# Patient Record
Sex: MALE | Race: WHITE | NOT HISPANIC OR LATINO | Employment: STUDENT | URBAN - METROPOLITAN AREA
[De-identification: names, ages, dates, MRNs, and addresses within clinical notes are randomized per-mention and may not be internally consistent; named-entity substitution may affect disease eponyms.]

---

## 2018-07-02 ENCOUNTER — OFFICE VISIT (OUTPATIENT)
Dept: PODIATRY | Facility: CLINIC | Age: 23
End: 2018-07-02
Payer: COMMERCIAL

## 2018-07-02 VITALS
SYSTOLIC BLOOD PRESSURE: 119 MMHG | DIASTOLIC BLOOD PRESSURE: 80 MMHG | BODY MASS INDEX: 28 KG/M2 | WEIGHT: 200 LBS | HEIGHT: 71 IN | HEART RATE: 86 BPM | RESPIRATION RATE: 17 BRPM

## 2018-07-02 DIAGNOSIS — M77.41 METATARSALGIA OF BOTH FEET: ICD-10-CM

## 2018-07-02 DIAGNOSIS — M21.969 ACQUIRED DEFORMITY OF FOOT, UNSPECIFIED LATERALITY: Primary | ICD-10-CM

## 2018-07-02 DIAGNOSIS — Q66.51 CONGENITAL PES PLANUS OF RIGHT FOOT: ICD-10-CM

## 2018-07-02 DIAGNOSIS — M77.42 METATARSALGIA OF BOTH FEET: ICD-10-CM

## 2018-07-02 DIAGNOSIS — Q66.52 CONGENITAL PES PLANUS OF LEFT FOOT: ICD-10-CM

## 2018-07-02 DIAGNOSIS — M79.671 PAIN IN BOTH FEET: ICD-10-CM

## 2018-07-02 DIAGNOSIS — M79.672 PAIN IN BOTH FEET: ICD-10-CM

## 2018-07-02 PROCEDURE — L3000 FT INSERT UCB BERKELEY SHELL: HCPCS | Performed by: PODIATRIST

## 2018-07-02 PROCEDURE — 99202 OFFICE O/P NEW SF 15 MIN: CPT | Performed by: PODIATRIST

## 2018-07-02 RX ORDER — MELATONIN
1000 DAILY
COMMUNITY

## 2018-07-02 RX ORDER — ESCITALOPRAM OXALATE 20 MG/1
TABLET ORAL
Refills: 0 | COMMUNITY
Start: 2018-06-26

## 2018-07-02 RX ORDER — ALPRAZOLAM 0.5 MG/1
TABLET ORAL
Refills: 0 | COMMUNITY
Start: 2018-06-26

## 2018-07-02 RX ORDER — AMPHETAMINE SULFATE 10 MG/1
TABLET ORAL
Refills: 0 | COMMUNITY
Start: 2018-05-12

## 2018-07-02 RX ORDER — MULTIVIT-MIN/IRON FUM/FOLIC AC 7.5 MG-4
1 TABLET ORAL DAILY
COMMUNITY

## 2018-07-02 NOTE — PROGRESS NOTES
Assessment/Plan:  Metatarsalgia  Pain upon ambulation  Pes planus  Plan  Foot exam performed  Patient educated on condition  Patient's feet have been casted for custom molded foot orthotics  There are no diagnoses linked to this encounter  Subjective:  Patient has pain in his big toes with ambulation  No history of trauma   Patient ID: Estephania Villeda is a 26 y o  male  No past medical history on file  No past surgical history on file  No Known Allergies      Current Outpatient Prescriptions:     ALPRAZolam (XANAX) 0 5 mg tablet, , Disp: , Rfl: 0    cholecalciferol (VITAMIN D3) 1,000 units tablet, Take 1,000 Units by mouth daily, Disp: , Rfl:     cyanocobalamin 100 MCG tablet, Take 100 mcg by mouth daily, Disp: , Rfl:     escitalopram (LEXAPRO) 20 mg tablet, , Disp: , Rfl: 0    EVEKEO 10 MG TABS, , Disp: , Rfl: 0    Lactobacillus (ACIDOPHILUS) 0 5 MG TABS, Take by mouth, Disp: , Rfl:     Multiple Vitamins-Minerals (MULTIVITAMIN WITH MINERALS) tablet, Take 1 tablet by mouth daily, Disp: , Rfl:     There is no problem list on file for this patient  HPI    The following portions of the patient's history were reviewed and updated as appropriate: allergies, current medications, past family history, past medical history, past social history, past surgical history and problem list     Review of Systems      Historical Information   No past medical history on file  No past surgical history on file  Social History   History   Alcohol use Not on file     History   Drug use: Unknown     Family History: No family history on file  Meds/Allergies   No Known Allergies    No current outpatient prescriptions on file prior to visit  No current facility-administered medications on file prior to visit  Objective:      Foot Exam    General  General Appearance: appears stated age and healthy   Orientation: alert and oriented to person, place, and time   Affect: appropriate   Gait: unimpaired       Right Foot/Ankle     Inspection and Palpation  Ecchymosis: none  Tenderness: great toe metatarsophalangeal joint   Swelling: great toe interphalangeal joint and great toe metatarsophalangeal joint   Arch: pes planus  Hammertoes: fifth toe  Hallux valgus: yes  Hallux limitus: yes  Skin Exam: callus; Neurovascular  Dorsalis pedis: 3+  Posterior tibial: 3+  Saphenous nerve sensation: normal  Tibial nerve sensation: normal  Superficial peroneal nerve sensation: normal  Deep peroneal nerve sensation: normal  Sural nerve sensation: normal  Achilles reflex: 2+  Babinski reflex: 2+    Tests  Too many toes: positive     Comments  Hallux valgus noted bilateral  Positive pinch callus bilateral    Left Foot/Ankle      Inspection and Palpation  Ecchymosis: none  Tenderness: great toe metatarsophalangeal joint   Swelling: great toe interphalangeal joint   Arch: pes planus  Hammertoes: fifth toe  Hallux valgus: yes  Hallux limitus: yes  Skin Exam: callus; Neurovascular  Dorsalis pedis: 3+  Posterior tibial: 3+  Saphenous nerve sensation: normal  Tibial nerve sensation: normal  Superficial peroneal nerve sensation: normal  Deep peroneal nerve sensation: normal  Sural nerve sensation: normal  Achilles reflex: 2+  Babinski reflex: 2+    Tests  Too many toes: positive     Comments  Patient is pronated in stance and gait  Physical Exam   Constitutional: He appears well-developed and well-nourished  Cardiovascular: Normal rate and regular rhythm  Pulses:       Dorsalis pedis pulses are 3+ on the right side, and 3+ on the left side  Posterior tibial pulses are 3+ on the right side, and 3+ on the left side  Feet:   Right Foot:   Skin Integrity: Positive for callus  Left Foot:   Skin Integrity: Positive for callus  Neurological:   Reflex Scores:       Achilles reflexes are 2+ on the right side and 2+ on the left side

## 2018-12-28 ENCOUNTER — OFFICE VISIT (OUTPATIENT)
Dept: PODIATRY | Facility: CLINIC | Age: 23
End: 2018-12-28
Payer: COMMERCIAL

## 2018-12-28 VITALS
SYSTOLIC BLOOD PRESSURE: 110 MMHG | RESPIRATION RATE: 17 BRPM | BODY MASS INDEX: 28 KG/M2 | WEIGHT: 200 LBS | HEART RATE: 71 BPM | DIASTOLIC BLOOD PRESSURE: 75 MMHG | HEIGHT: 71 IN

## 2018-12-28 DIAGNOSIS — M77.41 METATARSALGIA OF BOTH FEET: ICD-10-CM

## 2018-12-28 DIAGNOSIS — M77.42 METATARSALGIA OF BOTH FEET: ICD-10-CM

## 2018-12-28 DIAGNOSIS — M79.672 PAIN IN BOTH FEET: ICD-10-CM

## 2018-12-28 DIAGNOSIS — M79.671 PAIN IN BOTH FEET: ICD-10-CM

## 2018-12-28 DIAGNOSIS — M21.969 ACQUIRED DEFORMITY OF FOOT, UNSPECIFIED LATERALITY: Primary | ICD-10-CM

## 2018-12-28 PROCEDURE — 99213 OFFICE O/P EST LOW 20 MIN: CPT | Performed by: PODIATRIST

## 2018-12-28 NOTE — PROGRESS NOTES
Assessment/Plan:  Metatarsalgia  Pain upon ambulation  Pes planus  Plan  Patient will use orthotics as directed  He will change shoe gear size as directed  No problem-specific Assessment & Plan notes found for this encounter  There are no diagnoses linked to this encounter  Subjective:  Patient is concerned with blister formation of the left foot  He had been on a recent hike without his orthotics  He developed a painful blister  No treatment rendered    No past medical history on file  No past surgical history on file  No Known Allergies      Current Outpatient Prescriptions:     ALPRAZolam (XANAX) 0 5 mg tablet, , Disp: , Rfl: 0    cholecalciferol (VITAMIN D3) 1,000 units tablet, Take 1,000 Units by mouth daily, Disp: , Rfl:     cyanocobalamin 100 MCG tablet, Take 100 mcg by mouth daily, Disp: , Rfl:     escitalopram (LEXAPRO) 20 mg tablet, , Disp: , Rfl: 0    EVEKEO 10 MG TABS, , Disp: , Rfl: 0    Lactobacillus (ACIDOPHILUS) 0 5 MG TABS, Take by mouth, Disp: , Rfl:     Multiple Vitamins-Minerals (MULTIVITAMIN WITH MINERALS) tablet, Take 1 tablet by mouth daily, Disp: , Rfl:     Patient Active Problem List   Diagnosis    Acquired deformity of foot    Metatarsalgia of both feet    Pain in both feet    Congenital pes planus of right foot    Congenital pes planus of left foot          Patient ID: Chula Junior is a 26 y o  male  HPI    The following portions of the patient's history were reviewed and updated as appropriate: allergies, current medications, past family history, past medical history, past social history, past surgical history and problem list     Review of Systems      Objective:  Patient's shoes and socks removed  Foot ExamPhysical Exam   Constitutional: He appears well-developed and well-nourished  Cardiovascular: Normal rate and regular rhythm  Skin:   Dried bullae submetatarsal 2 noted of the left foot  No evidence of infection    There is pinch callus of 5th toe bilateral    Nursing note and vitals reviewed  Foot Exam     General  General Appearance: appears stated age and healthy   Orientation: alert and oriented to person, place, and time   Affect: appropriate   Gait: unimpaired         Right Foot/Ankle      Inspection and Palpation  Ecchymosis: none  Tenderness: great toe metatarsophalangeal joint   Swelling: great toe interphalangeal joint and great toe metatarsophalangeal joint   Arch: pes planus  Hammertoes: fifth toe  Hallux valgus: yes  Hallux limitus: yes  Skin Exam: callus;      Neurovascular  Dorsalis pedis: 3+  Posterior tibial: 3+  Saphenous nerve sensation: normal  Tibial nerve sensation: normal  Superficial peroneal nerve sensation: normal  Deep peroneal nerve sensation: normal  Sural nerve sensation: normal  Achilles reflex: 2+  Babinski reflex: 2+     Tests  Too many toes: positive      Comments  Hallux valgus noted bilateral  Positive pinch callus bilateral     Left Foot/Ankle       Inspection and Palpation  Ecchymosis: none  Tenderness: great toe metatarsophalangeal joint   Swelling: great toe interphalangeal joint   Arch: pes planus  Hammertoes: fifth toe  Hallux valgus: yes  Hallux limitus: yes  Skin Exam: callus;      Neurovascular  Dorsalis pedis: 3+  Posterior tibial: 3+  Saphenous nerve sensation: normal  Tibial nerve sensation: normal  Superficial peroneal nerve sensation: normal  Deep peroneal nerve sensation: normal  Sural nerve sensation: normal  Achilles reflex: 2+  Babinski reflex: 2+     Tests  Too many toes: positive      Comments  Patient is pronated in stance and gait  Physical Exam   Constitutional: He appears well-developed and well-nourished  Cardiovascular: Normal rate and regular rhythm  Pulses:       Dorsalis pedis pulses are 3+ on the right side, and 3+ on the left side  Posterior tibial pulses are 3+ on the right side, and 3+ on the left side     Feet:   Right Foot:   Skin Integrity: Positive for callus  Left Foot:   Skin Integrity: Positive for callus  Neurological:   Reflex Scores:       Achilles reflexes are 2+ on the right side and 2+ on the left side

## 2020-02-09 ENCOUNTER — OFFICE VISIT (OUTPATIENT)
Dept: URGENT CARE | Facility: CLINIC | Age: 25
End: 2020-02-09
Payer: COMMERCIAL

## 2020-02-09 VITALS
BODY MASS INDEX: 27.44 KG/M2 | DIASTOLIC BLOOD PRESSURE: 90 MMHG | HEIGHT: 71 IN | TEMPERATURE: 97.9 F | HEART RATE: 67 BPM | SYSTOLIC BLOOD PRESSURE: 130 MMHG | OXYGEN SATURATION: 99 % | RESPIRATION RATE: 16 BRPM | WEIGHT: 196 LBS

## 2020-02-09 DIAGNOSIS — R06.2 WHEEZE: Primary | ICD-10-CM

## 2020-02-09 DIAGNOSIS — J98.8 RESPIRATORY TRACT INFECTION: ICD-10-CM

## 2020-02-09 PROCEDURE — 99213 OFFICE O/P EST LOW 20 MIN: CPT | Performed by: NURSE PRACTITIONER

## 2020-02-09 PROCEDURE — 94640 AIRWAY INHALATION TREATMENT: CPT | Performed by: NURSE PRACTITIONER

## 2020-02-09 RX ORDER — IPRATROPIUM BROMIDE AND ALBUTEROL SULFATE 2.5; .5 MG/3ML; MG/3ML
3 SOLUTION RESPIRATORY (INHALATION) ONCE
Status: COMPLETED | OUTPATIENT
Start: 2020-02-09 | End: 2020-02-09

## 2020-02-09 RX ORDER — FLUTICASONE PROPIONATE 50 MCG
1 SPRAY, SUSPENSION (ML) NASAL DAILY
COMMUNITY

## 2020-02-09 RX ADMIN — IPRATROPIUM BROMIDE AND ALBUTEROL SULFATE 3 ML: 2.5; .5 SOLUTION RESPIRATORY (INHALATION) at 17:00

## 2020-02-09 NOTE — LETTER
February 9, 2020     Patient: Delmis Brown   YOB: 1995   Date of Visit: 2/9/2020       To Whom it May Concern:    Anaid Chaparrobirgit was seen in my clinic on 2/9/2020  He may return to school on 2/11/2020  If you have any questions or concerns, please don't hesitate to call           Sincerely,          COHOA Cr        CC: No Recipients

## 2020-02-09 NOTE — PROGRESS NOTES
[facility]        NAME: Rosalinda Martinez is a 27 y o  male  : 1995    MRN: [id number]  DATE: 2020  TIME: 4:54 pm    Assessment and Plan   Wheeze [R06 2]  1  Wheeze  ipratropium-albuterol (DUO-NEB) 0 5-2 5 mg/3 mL inhalation solution 3 mL   2  Respiratory tract infection       Paper scripts provided for Zithromax and Prednisone due to pharmacy hours  Mom needs to find open pharmacy  Mini neb  Performed by: OCHOA Harvey  Authorized by: OCHOA Harvey     Treatment 1:   Pre-Procedure     Symptoms:  Wheezing    Lung Sounds:  Wheezing, rhonchi in bases    HR:  67    RR:  16    SP02:  99    Medication Administered:  Duoneb - Albuterol 2 5 mg/Atrovent 0 5 mg  Post-Procedure     Symptoms:  Wheezing    Lung sounds:  Less wheezing, rhinchi diminished    HR:  72    RR:  18    SP02:  99        Patient Instructions     Take antibiotic as prescribed  Take Steroid as prescribed  Tylenol as needed stay well hydrated  Rest  Follow up with PCP in 3-5 days  Proceed to  ER if symptoms worsen  Chief Complaint     Chief Complaint   Patient presents with    Cold Like Symptoms     patient complains of nasal congestion and chest congestion, also complains of vomitting, cough with some chest discomfort from the cough  Was seen by his PCP on Thursday was given no treatment  History of Present Illness       28 y/o male presents with cough, congestion, wheezing, PND, vomiting with coughing, and SOB on exertion that began 2 weeks ago  He was seen by his PCP several days ago and was instructed to try OTC remedies  Since then he states his symptoms have gotten worse  He denies fevers, N/V, or HA  He is a non-smoker  Has a history of asthma  Review of Systems   Review of Systems   Constitutional: Positive for fatigue  Negative for chills and fever  HENT: Positive for congestion, postnasal drip and rhinorrhea  Negative for ear pain and sore throat  Respiratory: Positive for cough, shortness of breath and wheezing  Cardiovascular: Negative for chest pain and palpitations  Gastrointestinal: Negative for abdominal pain, nausea and vomiting  Musculoskeletal: Negative for myalgias  Skin: Positive for pallor  Neurological: Negative for headaches  Current Medications       Current Outpatient Medications:     ALPRAZolam (XANAX) 0 5 mg tablet, , Disp: , Rfl: 0    cholecalciferol (VITAMIN D3) 1,000 units tablet, Take 1,000 Units by mouth daily, Disp: , Rfl:     cyanocobalamin 100 MCG tablet, Take 100 mcg by mouth daily, Disp: , Rfl:     escitalopram (LEXAPRO) 20 mg tablet, , Disp: , Rfl: 0    EVEKEO 10 MG TABS, , Disp: , Rfl: 0    fluticasone (FLONASE) 50 mcg/act nasal spray, 1 spray into each nostril daily, Disp: , Rfl:     Lactobacillus (ACIDOPHILUS) 0 5 MG TABS, Take by mouth, Disp: , Rfl:     Multiple Vitamins-Minerals (MULTIVITAMIN WITH MINERALS) tablet, Take 1 tablet by mouth daily, Disp: , Rfl:     Current Allergies     Allergies as of 02/09/2020    (No Known Allergies)            The following portions of the patient's history were reviewed and updated as appropriate: allergies, current medications, past family history, past medical history, past social history, past surgical history and problem list      Past Medical History:   Diagnosis Date    Asthma     Autism     Fracture     Left arm    Reactive airway disease        Past Surgical History:   Procedure Laterality Date    NO PAST SURGERIES         History reviewed  No pertinent family history  Medications have been verified  Objective   /90 (BP Location: Left arm, Patient Position: Sitting, Cuff Size: Standard)   Pulse 67   Temp 97 9 °F (36 6 °C) (Tympanic)   Resp 16   Ht 5' 11" (1 803 m)   Wt 88 9 kg (196 lb)   SpO2 99%   BMI 27 34 kg/m²        Physical Exam     Physical Exam   Constitutional: He is oriented to person, place, and time   Vital signs are normal  He appears well-developed and well-nourished  He appears ill  HENT:   Right Ear: Tympanic membrane, external ear and ear canal normal    Left Ear: Tympanic membrane, external ear and ear canal normal    Nose: Mucosal edema and rhinorrhea present  Mouth/Throat: Posterior oropharyngeal erythema present  No posterior oropharyngeal edema  Posterior pharynx is red and inflamed, cobblestone in appearance  Cardiovascular: Normal rate, regular rhythm and normal heart sounds  Pulmonary/Chest: Effort normal  No respiratory distress  He has wheezes in the right upper field and the left upper field  He has rhonchi in the right lower field and the left lower field  End expiratory wheeze present in bilateral upper lobes  Rhonchi present in bilateral bases, rhonchi cleared after breathing treatment  Musculoskeletal: Normal range of motion  Lymphadenopathy:        Head (right side): No tonsillar adenopathy present  Head (left side): No tonsillar adenopathy present  He has no cervical adenopathy  Neurological: He is alert and oriented to person, place, and time  He has normal strength  GCS eye subscore is 4  GCS verbal subscore is 5  GCS motor subscore is 6  Skin: Skin is warm and dry  No rash noted  Psychiatric: He has a normal mood and affect  His speech is normal and behavior is normal    Nursing note and vitals reviewed

## 2020-02-09 NOTE — PATIENT INSTRUCTIONS
Take antibiotic as prescribed  Take Steroid as prescribed  Tylenol as needed stay well hydrated  Rest

## 2020-07-17 ENCOUNTER — TELEMEDICINE (OUTPATIENT)
Dept: BEHAVIORAL/MENTAL HEALTH CLINIC | Facility: CLINIC | Age: 25
End: 2020-07-17
Payer: COMMERCIAL

## 2020-07-17 DIAGNOSIS — F84.0 AUTISTIC DISORDER, RESIDUAL STATE: Primary | ICD-10-CM

## 2020-07-17 PROCEDURE — 90834 PSYTX W PT 45 MINUTES: CPT

## 2020-07-17 NOTE — BH TREATMENT PLAN
Anatoliy Hargrove  1995       Date of Initial Treatment Plan: 7/17/20   Date of Current Treatment Plan: 07/17/20    Treatment Plan Number 1     Strengths/Personal Resources for Self Care: supportive family and creative    Diagnosis:   1  Autistic disorder, residual state         Area of Needs: social skills      Long Term Goal 1: Richard improve social comfort levels    Target Date: N/A  Completion Date: N/A         Short Term Objectives for Goal 1: Averbally report positive outcomes of socializing and Bdescribe positive feelings associated with socializing  Long Term Goal 2: N/A    Target Date: N/A  Completion Date: N/A    Short Term Objectives for Goal 2: N/A         Long Term Goal # 3: N/A     Target Date: N/A  Completion Date: N/A    Short Term Objectives for Goal 3: N/A    GOAL 1: Modality: Individual 2x per month   Completion Date 12/17/20    GOAL 2: Modality: Individual 2x per month   Completion Date 12/17/20     GOAL 3: Modality: The person(s) responsible for carrying out the plan is  Yoko Billy and [address] [place] [address]: Diagnosis and Treatment Plan explained to Zulema Munguia relates understanding diagnosis and is agreeable to Treatment Plan         Client Comments : Please share your thoughts, feelings, need and/or experiences regarding your treatment plan: ct agreed to treatment plan during virtual session

## 2020-07-17 NOTE — PSYCH
Psychotherapy Provided: Individual Psychotherapy 45 minutes     Length of time in session: 45 minutes, follow up in 2 week    Goals addressed in session: Goal 1     Pain:      none    0    Current suicide risk : Low     D- ct reports that his college classes will be online and since he does not do well in online courses he will only take one course  Ct has been helping with an art class and he finds it to be rewarding as he communicates better with younger people  Ct reports that he got together with peers and did a ghost Buster appearance at the drive in movies  Ct enjoyed the experience and enjoys dressing up  Ct is making a Star Wars costume for his cousin to be given to her next weekend  Ct denies any family issues  A- ct was tense in session and speech was halting at times  Ct has a difficult time having a conversation that is 2 sided  P- ct will attend sessions as scheduled and work on communication skills  Behavioral Health Treatment Plan [facility]: Diagnosis and Treatment Plan explained to Esperanza Crandall relates understanding diagnosis and is agreeable to Treatment Plan   Yes

## 2020-08-14 ENCOUNTER — SOCIAL WORK (OUTPATIENT)
Dept: BEHAVIORAL/MENTAL HEALTH CLINIC | Facility: CLINIC | Age: 25
End: 2020-08-14
Payer: COMMERCIAL

## 2020-08-14 DIAGNOSIS — F84.0 AUTISTIC DISORDER, RESIDUAL STATE: Primary | ICD-10-CM

## 2020-08-14 PROCEDURE — 90834 PSYTX W PT 45 MINUTES: CPT

## 2020-08-14 NOTE — PSYCH
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  Ct denies being bored this summer like he was in previous summers  Ct believes that a coworker is writing negative reviews about him on the web  Ct cannot prove it but some of the stuff written is something she or the owner would know  A- ct was a little anxious and tense  Ct exhibits poor eye contact and when thinking of what to say moves his head frequently thus avoiding eye contact  P- ct will attend sessions and use techniques to improve social skills  Behavioral Health Treatment Plan [facility]: Diagnosis and Treatment Plan explained to Joel Krishnan relates understanding diagnosis and is agreeable to Treatment Plan   Yes

## 2020-09-11 ENCOUNTER — SOCIAL WORK (OUTPATIENT)
Dept: BEHAVIORAL/MENTAL HEALTH CLINIC | Facility: CLINIC | Age: 25
End: 2020-09-11
Payer: COMMERCIAL

## 2020-09-11 DIAGNOSIS — F84.0 AUTISTIC DISORDER, RESIDUAL STATE: Primary | ICD-10-CM

## 2020-09-11 PROCEDURE — 90834 PSYTX W PT 45 MINUTES: CPT

## 2020-09-11 NOTE — PSYCH
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  Ct is pleased with that since he was thinking of leaving  Ct reports that his mother was in the hospital for 2 days due to numbness related to autoimmune issues  Ct reports that she is home  Ct is missing in person college and other activities  Ct is keeping busy online with friends  Ct just recently joined a [employer] group that [employer] in the [facility]  A ct presented as stressed and anxious  Ct thought content was clear and focused  Ct eye contact was fair  Ct insight and judgement are limited  Behavioral Health Treatment Plan [facility]: Diagnosis and Treatment Plan explained to Wilber Menjivar relates understanding diagnosis and is agreeable to Treatment Plan   Yes

## 2020-10-09 ENCOUNTER — SOCIAL WORK (OUTPATIENT)
Dept: BEHAVIORAL/MENTAL HEALTH CLINIC | Facility: CLINIC | Age: 25
End: 2020-10-09
Payer: COMMERCIAL

## 2020-10-09 DIAGNOSIS — F84.0 AUTISTIC DISORDER, RESIDUAL STATE: Primary | ICD-10-CM

## 2020-10-09 PROCEDURE — 90834 PSYTX W PT 45 MINUTES: CPT

## 2020-10-23 ENCOUNTER — SOCIAL WORK (OUTPATIENT)
Dept: BEHAVIORAL/MENTAL HEALTH CLINIC | Facility: CLINIC | Age: 25
End: 2020-10-23
Payer: COMMERCIAL

## 2020-10-23 DIAGNOSIS — F84.0 AUTISTIC DISORDER, RESIDUAL STATE: Primary | ICD-10-CM

## 2020-10-23 PROCEDURE — 90834 PSYTX W PT 45 MINUTES: CPT

## 2020-11-06 ENCOUNTER — SOCIAL WORK (OUTPATIENT)
Dept: BEHAVIORAL/MENTAL HEALTH CLINIC | Facility: CLINIC | Age: 25
End: 2020-11-06
Payer: COMMERCIAL

## 2020-11-06 DIAGNOSIS — F84.0 AUTISTIC DISORDER, RESIDUAL STATE: Primary | ICD-10-CM

## 2020-11-06 PROCEDURE — 90834 PSYTX W PT 45 MINUTES: CPT

## 2020-12-04 ENCOUNTER — TELEMEDICINE (OUTPATIENT)
Dept: BEHAVIORAL/MENTAL HEALTH CLINIC | Facility: CLINIC | Age: 25
End: 2020-12-04
Payer: COMMERCIAL

## 2020-12-04 DIAGNOSIS — F84.0 AUTISTIC DISORDER, RESIDUAL STATE: Primary | ICD-10-CM

## 2020-12-04 PROCEDURE — 90834 PSYTX W PT 45 MINUTES: CPT

## 2020-12-18 ENCOUNTER — TELEMEDICINE (OUTPATIENT)
Dept: BEHAVIORAL/MENTAL HEALTH CLINIC | Facility: CLINIC | Age: 25
End: 2020-12-18
Payer: COMMERCIAL

## 2020-12-18 DIAGNOSIS — F84.0 AUTISTIC DISORDER, RESIDUAL STATE: Primary | ICD-10-CM

## 2020-12-18 PROCEDURE — 90834 PSYTX W PT 45 MINUTES: CPT

## 2021-01-15 ENCOUNTER — TELEMEDICINE (OUTPATIENT)
Dept: BEHAVIORAL/MENTAL HEALTH CLINIC | Facility: CLINIC | Age: 26
End: 2021-01-15
Payer: COMMERCIAL

## 2021-01-15 DIAGNOSIS — F84.0 AUTISTIC DISORDER, RESIDUAL STATE: Primary | ICD-10-CM

## 2021-01-15 PROCEDURE — 90834 PSYTX W PT 45 MINUTES: CPT

## 2021-01-15 NOTE — BH TREATMENT PLAN
Gilmar Ramirez  1995       Date of Initial Treatment Plan: 7/17/2020   Date of Current Treatment Plan: 01/15/21    Treatment Plan Number 2     Strengths/Personal Resources for Self Care: improved social skills    Diagnosis:   1  Autistic disorder, residual state         Area of Needs: conversation pace and eye contact      Long Term Goal 1: Bonny find work, get drivers license, and continue networking on social media    Target Date: N/A  Completion Date: N/A         Short Term Objectives for Goal 1: Act will work with [employer] to find work, Bct will practice and work with  and Dixie Portillo 72[address] will 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 and have portfolio ready to share    Long Term Goal 2: N/A    Target Date: N/A  Completion Date: N/A    Short Term Objectives for Goal 2: N/A         Long Term Goal # 3: N/A     Target Date: N/A  Completion Date: N/A    Short Term Objectives for Goal 3: N/A    GOAL 1: Modality: Individual 2x per month   Completion Date 6/15/21        [address]: Diagnosis and Treatment Plan explained to Ananya Hardin relates understanding diagnosis and is agreeable to Treatment Plan         Client Comments : Please share your thoughts, feelings, need and/or experiences regarding your treatment plan: ct agreed

## 2021-01-15 NOTE — PSYCH
Psychotherapy Provided: Individual Psychotherapy 45 minutes     Length of time in session: 45 minutes, follow up in 2 week    Goals addressed in session: Goal 1     Pain:      none    0    Current suicide risk : Low     D- ct shared that he failed the one college class he retook  Ct was upset because the class was virtual and he does better with concrete feedback  Ct is taking time off from school and will work with [employer] to get a job  Ct reports that he had a good holiday season and participated with the family  Ct shared that he is working on side projects with friends and has a job to add special effects to someone's video  Ct session was a phone session and ct agreed to parameters of phone session  A- ct presented with anxious mood in regards to college class  Ct denied any depressed or down moods  Ct reports decent sleep  Ct insight and judgement are limited  P- ct will attend sessions and use techniques to improve social skills and reach goals  Behavioral Health Treatment Plan [facility]: Diagnosis and Treatment Plan explained to Fabian Hernandez relates understanding diagnosis and is agreeable to Treatment Plan   Yes

## 2021-01-29 ENCOUNTER — SOCIAL WORK (OUTPATIENT)
Dept: BEHAVIORAL/MENTAL HEALTH CLINIC | Facility: CLINIC | Age: 26
End: 2021-01-29
Payer: COMMERCIAL

## 2021-01-29 DIAGNOSIS — F84.0 AUTISTIC DISORDER, RESIDUAL STATE: Primary | ICD-10-CM

## 2021-01-29 PROCEDURE — 90834 PSYTX W PT 45 MINUTES: CPT

## 2021-01-29 NOTE — PSYCH
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  Ct reports less stress at home  Ct shared that he is staying up late but hat he is not over sleeping  Ct is looking to get a job and [employer] is helping him do that  A- ct was mildly anxious and eye contact was fair  Ct has a difficult time maintaining a conversation in session for more then a couple of sentences  Ct insight and judgement are limited  P- ct will attend session and use techniques to improve communication  Behavioral Health Treatment Plan [facility]: Diagnosis and Treatment Plan explained to Augustin Madrid relates understanding diagnosis and is agreeable to Treatment Plan   Yes

## 2021-02-12 ENCOUNTER — SOCIAL WORK (OUTPATIENT)
Dept: BEHAVIORAL/MENTAL HEALTH CLINIC | Facility: CLINIC | Age: 26
End: 2021-02-12
Payer: COMMERCIAL

## 2021-02-12 DIAGNOSIS — F84.0 AUTISTIC DISORDER, RESIDUAL STATE: Primary | ICD-10-CM

## 2021-02-12 PROCEDURE — 90834 PSYTX W PT 45 MINUTES: CPT

## 2021-02-12 NOTE — PSYCH
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  Ct is practicing his driving  Ct has been on several highways and is getting more comfortable  Ct is feeling less stress now that he is not in college  Ct is looking for work with [employer]  Ct went to a convention for the first time in close to a year and it went well  Ct went dressed as a [employer]  A-  Ct presented with fair eye contact  Ct was verbal and answered all questions  Ct struggles with maintaining a conversation and clinician usually has to extend it or change to a new topic  P- ct will attend sessions and use techniques to improve social skills  Behavioral Health Treatment Plan [facility]: Diagnosis and Treatment Plan explained to Maria Luisa Estrada relates understanding diagnosis and is agreeable to Treatment Plan   Yes

## 2021-02-26 ENCOUNTER — SOCIAL WORK (OUTPATIENT)
Dept: BEHAVIORAL/MENTAL HEALTH CLINIC | Facility: CLINIC | Age: 26
End: 2021-02-26
Payer: COMMERCIAL

## 2021-02-26 DIAGNOSIS — F84.0 AUTISTIC DISORDER, RESIDUAL STATE: Primary | ICD-10-CM

## 2021-02-26 PROCEDURE — 90834 PSYTX W PT 45 MINUTES: CPT

## 2021-02-26 NOTE — PSYCH
Psychotherapy Provided: Individual Psychotherapy 45 minutes     Length of time in session: 45 minutes, follow up in 2 week    Goals addressed in session: Goal 1     Pain:      none    0    Current suicide risk : Low     D- ct reported that he has been disappointed that [employer] has not been able to help him with finding work  Ct was encouraged to communicate with them and ask them if he is doing anything wrong in regards to the process  Ct shared that they send him a list of openings and he responds that he is interested then nothing happens  Ct is starting to feel the strain of having too much free time  Ct keeps busy online and with his side projects but the pandemic and the cold have curtailed his activities  Ct is going to a small con this Sunday  A- ct presented with anxious mood  Ct eye contact was fair  Ct insight and judgement were fair  P- ct will attend session and use techniques to lower anxiety and improve social situations  Behavioral Health Treatment Plan [facility]: Diagnosis and Treatment Plan explained to Augustin Madrid relates understanding diagnosis and is agreeable to Treatment Plan   Yes

## 2021-03-12 ENCOUNTER — SOCIAL WORK (OUTPATIENT)
Dept: BEHAVIORAL/MENTAL HEALTH CLINIC | Facility: CLINIC | Age: 26
End: 2021-03-12
Payer: COMMERCIAL

## 2021-03-12 DIAGNOSIS — F84.0 AUTISTIC DISORDER, RESIDUAL STATE: Primary | ICD-10-CM

## 2021-03-12 PROCEDURE — 90834 PSYTX W PT 45 MINUTES: CPT

## 2021-03-12 NOTE — PSYCH
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 now that the weather is clearing up  Ct denies any issues with anxiety and mood issues  Ct is feeling less bored because he helped his grandmother move and 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

## 2021-04-09 ENCOUNTER — SOCIAL WORK (OUTPATIENT)
Dept: BEHAVIORAL/MENTAL HEALTH CLINIC | Facility: CLINIC | Age: 26
End: 2021-04-09
Payer: COMMERCIAL

## 2021-04-09 DIAGNOSIS — F84.0 AUTISTIC DISORDER, RESIDUAL STATE: Primary | ICD-10-CM

## 2021-04-09 PROCEDURE — 90834 PSYTX W PT 45 MINUTES: CPT

## 2021-04-09 NOTE — PSYCH
Psychotherapy Provided: Individual Psychotherapy 45 minutes     Length of time in session: 45 minutes, follow up in 2 week    Goals addressed in session: Goal 1     Pain:      none    0    Current suicide risk : Low     D- ct shared that he was disappointed that he did not go to [place] to visit with family for Easter  Ct feels that his mother not telling him why they did not go  Ct shared that he is trying to keep busy with friends and some creative projects  Ct reports that [employer] has not been able to help him find a job  Ct will reach out to them and may need my assistance to reach out to them  A- ct was mildly anxious  Ct was more verbal this session  Ct still has trouble having a conversation and maintaining it  Ct insight and judgement is fair  P- ct will attend session, take med's as needed, and will use techniques to manage anxiety and improve social skills  Behavioral Health Treatment Plan [facility]: Diagnosis and Treatment Plan explained to Atul Nash relates understanding diagnosis and is agreeable to Treatment Plan   Yes

## 2021-04-23 ENCOUNTER — SOCIAL WORK (OUTPATIENT)
Dept: BEHAVIORAL/MENTAL HEALTH CLINIC | Facility: CLINIC | Age: 26
End: 2021-04-23
Payer: COMMERCIAL

## 2021-04-23 DIAGNOSIS — F84.0 AUTISTIC DISORDER, RESIDUAL STATE: Primary | ICD-10-CM

## 2021-04-23 PROCEDURE — 90834 PSYTX W PT 45 MINUTES: CPT

## 2021-04-23 NOTE — PSYCH
Psychotherapy Provided: Individual Psychotherapy 45 minutes     Length of time in session: 45 minutes, follow up in 2 week    Encounter Diagnosis     ICD-10-CM    1  Autistic disorder, residual state  F84 0        Goals addressed in session: Goal 1     Pain:      none    0    Current suicide risk : Low     D- ct shared that he is going for an on the job interview next week with [employer]  Ct is looking forward to it  We went over some social skills and ct is confident he will do well  Ct will meet with Uncle this week to work on his car  Ct denies any issues with family at this time  Ct is keeping busy with his online projects as well as creating a throne from an old chair he found in the shed  A- ct presented with euthymic mood and flat affect  Ct eye contact was fair  Ct was engaged in session but struggles with detailed talking points  P- ct will attend session, take med's as ordered, and use techniques to improve social skills  Behavioral Health Treatment Plan [facility]: Diagnosis and Treatment Plan explained to Alphonso Lawrence relates understanding diagnosis and is agreeable to Treatment Plan   Yes

## 2021-05-21 ENCOUNTER — SOCIAL WORK (OUTPATIENT)
Dept: BEHAVIORAL/MENTAL HEALTH CLINIC | Facility: CLINIC | Age: 26
End: 2021-05-21
Payer: COMMERCIAL

## 2021-05-21 DIAGNOSIS — F84.0 AUTISTIC DISORDER, RESIDUAL STATE: Primary | ICD-10-CM

## 2021-05-21 PROCEDURE — 90834 PSYTX W PT 45 MINUTES: CPT

## 2021-05-21 NOTE — PSYCH
Psychotherapy Provided: Individual Psychotherapy 45 minutes     Length of time in session: 45 minutes, follow up in 2 week    Encounter Diagnosis     ICD-10-CM    1  Autistic disorder, residual state  F84 0        Goals addressed in session: Goal 1     Pain:      none    0    Current suicide risk : Low     D- ct shared that he had a birthday yesterday and that it went well  Ct has been working on his car  Ct  Did not get job at [employer]  Ct is applying to [employer]  Ct has 2 conventions coming up in the month of June  Ct is mildly annoyed with mother who is giving him advice on how to go about hosting a table at a con  Ct is also feeling discouraged about not meeting women however ct would like for them to initiate since he has anxiety over initiating conversations with females  A- ct presented as mildly anxious with blunted affect  Ct reports good sleep and just being bored some of the time  P- ct will attend session, and use techniques to decrease anxiety and improve communication  Behavioral Health Treatment Plan [facility]: Diagnosis and Treatment Plan explained to Oj Delgado relates understanding diagnosis and is agreeable to Treatment Plan   Yes

## 2021-06-04 ENCOUNTER — SOCIAL WORK (OUTPATIENT)
Dept: BEHAVIORAL/MENTAL HEALTH CLINIC | Facility: CLINIC | Age: 26
End: 2021-06-04
Payer: COMMERCIAL

## 2021-06-04 DIAGNOSIS — F84.0 AUTISTIC DISORDER, RESIDUAL STATE: Primary | ICD-10-CM

## 2021-06-04 PROCEDURE — 90834 PSYTX W PT 45 MINUTES: CPT
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
## 2021-07-02 ENCOUNTER — SOCIAL WORK (OUTPATIENT)
Dept: BEHAVIORAL/MENTAL HEALTH CLINIC | Facility: CLINIC | Age: 26
End: 2021-07-02
Payer: COMMERCIAL

## 2021-07-02 DIAGNOSIS — F84.0 AUTISTIC DISORDER, RESIDUAL STATE: Primary | ICD-10-CM

## 2021-07-02 PROCEDURE — 90834 PSYTX W PT 45 MINUTES: CPT

## 2021-07-02 NOTE — PSYCH
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

## 2021-07-02 NOTE — BH TREATMENT PLAN
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, Bct will continue to send out resumes and Cct will network with peers who have jobs and seek out referrals    Long Term Goal 2: N/A    Target Date: N/A  Completion Date: N/A    Short Term Objectives for Goal 2: N/A         Long Term Goal # 3: N/A     Target Date: N/A  Completion Date: N/A    Short Term Objectives for Goal 3: N/A    GOAL 1: Modality: Individual 2x per month   Completion Date 12/2/2021    [address]: Diagnosis and Treatment Plan explained to Sangeetha Carbone relates understanding diagnosis and is agreeable to Treatment Plan  Client Comments : Please share your thoughts, feelings, need and/or experiences regarding your treatment plan: ct agreed

## 2021-07-10 ENCOUNTER — OFFICE VISIT (OUTPATIENT)
Dept: URGENT CARE | Facility: CLINIC | Age: 26
End: 2021-07-10
Payer: COMMERCIAL

## 2021-07-10 VITALS
RESPIRATION RATE: 18 BRPM | HEART RATE: 101 BPM | BODY MASS INDEX: 27.02 KG/M2 | TEMPERATURE: 98.1 F | DIASTOLIC BLOOD PRESSURE: 76 MMHG | OXYGEN SATURATION: 98 % | WEIGHT: 193 LBS | SYSTOLIC BLOOD PRESSURE: 115 MMHG | HEIGHT: 71 IN

## 2021-07-10 DIAGNOSIS — J32.9 RHINOSINUSITIS: Primary | ICD-10-CM

## 2021-07-10 DIAGNOSIS — J31.0 RHINOSINUSITIS: Primary | ICD-10-CM

## 2021-07-10 PROCEDURE — 99213 OFFICE O/P EST LOW 20 MIN: CPT | Performed by: PHYSICIAN ASSISTANT

## 2021-07-10 RX ORDER — FLUTICASONE PROPIONATE 50 MCG
1 SPRAY, SUSPENSION (ML) NASAL DAILY
Qty: 1 G | Refills: 0 | Status: SHIPPED | OUTPATIENT
Start: 2021-07-10

## 2021-07-10 RX ORDER — AMOXICILLIN 875 MG/1
875 TABLET, COATED ORAL 2 TIMES DAILY
Qty: 14 TABLET | Refills: 0 | Status: SHIPPED | OUTPATIENT
Start: 2021-07-10 | End: 2021-07-17

## 2021-07-10 NOTE — PATIENT INSTRUCTIONS
Rhinosinusitis   WHAT YOU NEED TO KNOW:   Rhinosinusitis (RS) is inflammation of your nose and sinuses  It commonly begins as a virus, often as a common cold  Viruses usually last 7 to 10 days and do not need treatment  When the virus does not get better on its own, you may have bacterial RS  This means that bacteria have begun to grow inside your sinuses  Acute RS lasts less than 4 weeks  Chronic RS lasts 12 weeks or more  Recurrent RS is when you have 4 or more episodes of RS in one year  DISCHARGE INSTRUCTIONS:   Return to the emergency department if:   · Your eye and eyelid are red, swollen, and painful  · You cannot open your eye  · You have double vision or you cannot see  · Your eyeball bulges out or you cannot move your eye  · You are more sleepy than normal or you notice changes in your ability to think, move, or talk  · You have a stiff neck, a fever, or a bad headache  · You have swelling of your forehead or scalp  Contact your healthcare provider if:   · Your symptoms are worse or do not improve after 3 to 5 days of treatment  · You have questions or concerns about your condition or care  Medicines: You may need any of the following:  · Acetaminophen  decreases pain and fever  It is available without a doctor's order  Ask how much to take and how often to take it  Follow directions  Acetaminophen can cause liver damage if not taken correctly  · NSAIDs , such as ibuprofen, help decrease swelling, pain, and fever  This medicine is available with or without a doctor's order  NSAIDs can cause stomach bleeding or kidney problems in certain people  If you take blood thinner medicine, always ask your healthcare provider if NSAIDs are safe for you  Always read the medicine label and follow directions  · Nasal steroid sprays  decrease inflammation in your nose and sinuses  · Decongestants  reduce swelling and drain mucus in the nose and sinuses   They may help you breathe easier  · Antihistamines  dry mucus in the nose and relieve sneezing  · Antibiotics  treat a bacterial infection and may be needed if your symptoms do not improve or they get worse  · Take your medicine as directed  Contact your healthcare provider if you think your medicine is not helping or if you have side effects  Tell him or her if you are allergic to any medicine  Keep a list of the medicines, vitamins, and herbs you take  Include the amounts, and when and why you take them  Bring the list or the pill bottles to follow-up visits  Carry your medicine list with you in case of an emergency  Self-care:   · Rinse your sinuses  Use a sinus rinse device to rinse your nasal passages with a saline (salt water) solution  This will help thin the mucus in your nose and rinse away pollen and dirt  It will also help reduce swelling so you can breathe normally  Ask your healthcare provider how often to do this  · Breathe in steam   Heat a bowl of water until you see steam  Lean over the bowl and make a tent over your head with a large towel  Breathe deeply for about 20 minutes  Be careful not to get too close to the steam or burn yourself  Do this 3 times a day  You can also breathe deeply when you take a hot shower  · Sleep with your head elevated  Place an extra pillow under your head before you go to sleep to help your sinuses drain  · Drink liquids as directed  Ask your healthcare provider how much liquid to drink each day and which liquids are best for you  Liquids will thin the mucus in your nose and help it drain  Avoid drinks that contain alcohol or caffeine  · Do not smoke, and avoid secondhand smoke  Nicotine and other chemicals in cigarettes and cigars can make your symptoms worse  Ask your healthcare provider for information if you currently smoke and need help to quit  E-cigarettes or smokeless tobacco still contain nicotine   Talk to your healthcare provider before you use these products  Follow up with your healthcare provider as directed: Follow up if your symptoms are worse or not better after 3 to 5 days of treatment  Write down your questions so you remember to ask them during your visits  © Copyright [address] Information is for End User's use only and may not be sold, redistributed or otherwise used for commercial purposes  All illustrations and images included in CareNotes® are the copyrighted property of A D A M , Inc  or [facility]   The above information is an  only  It is not intended as medical advice for individual conditions or treatments  Talk to your doctor, nurse or pharmacist before following any medical regimen to see if it is safe and effective for you  Acute Rhinosinusitis    -Due to the severity and duration of the patients symptoms will begin treatment with Amoxicillin 875mg taken as prescribed  Take with food and a probiotic    -Fluticasone nasal spray for nasal congestion and PND  -Stay well hydrated and rest  You can use a humidifier next to your bed  Take steam showers  -Take Advil or Tylenol for fever or pain  -You can take OTC Mucinex  You may try nasal rinses     -If your symptoms worsen or change follow up with your PCP immediately

## 2021-07-10 NOTE — PROGRESS NOTES
[facility]        NAME: Kameron Rodriguez is a 27 y o  male  : 1995    MRN: [id number]  DATE: July 10, 2021  TIME: 2:18PM    Assessment and Plan   Rhinosinusitis [J31 0, J32 9]  1  Rhinosinusitis  amoxicillin (AMOXIL) 875 mg tablet    fluticasone (FLONASE) 50 mcg/act nasal spray         Patient Instructions   Acute Rhinosinusitis    -Due to the severity and duration of the patients symptoms will begin treatment with Amoxicillin 875mg taken as prescribed  Take with food and a probiotic    -Fluticasone nasal spray for nasal congestion and PND  -Stay well hydrated and rest  You can use a humidifier next to your bed  Take steam showers  -Take Advil or Tylenol for fever or pain  -You can take OTC Mucinex  You may try nasal rinses  -If your symptoms worsen or change follow up with your PCP immediately       Follow up with PCP in 3-5 days  Proceed to  ER if symptoms worsen  Chief Complaint     Chief Complaint   Patient presents with    Cold Like Symptoms     NASAL CONGESTION, THROAT CONGESTION PND         History of Present Illness       The patient is a 21-year-old male who presents today for a two day hx of nasal congestion and post nasal drainage  He has a hx of seasonal allergies and has been taking Nyquil and Zyrtec daily  Over the last two days he has worsening congestion and PND  He has mild maxillary sinus pressure and pain  No fever, chills, body aches, headache  No dizziness or weakness  No cough, dyspnea, wheezing  No GI sx  No sick contacts or recent travel  He did not have the COVID vaccination yet  Review of Systems   Review of Systems   Constitutional: Positive for fatigue  Negative for activity change, appetite change, chills, diaphoresis and fever  HENT: Positive for congestion, postnasal drip, rhinorrhea, sinus pressure and sinus pain  Negative for ear discharge, ear pain, facial swelling, hearing loss, sore throat, tinnitus, trouble swallowing and voice change  Eyes: Negative for visual disturbance  Respiratory: Negative for apnea, cough, chest tightness, shortness of breath, wheezing and stridor  Cardiovascular: Negative for chest pain, palpitations and leg swelling  Gastrointestinal: Negative for abdominal distention and abdominal pain  Genitourinary: Negative for decreased urine volume  Musculoskeletal: Negative for arthralgias, joint swelling, myalgias, neck pain and neck stiffness  Skin: Negative for rash  Allergic/Immunologic: Negative for immunocompromised state  Neurological: Negative for dizziness, weakness, light-headedness, numbness and headaches  Hematological: Negative for adenopathy           Current Medications       Current Outpatient Medications:     cholecalciferol (VITAMIN D3) 1,000 units tablet, Take 1,000 Units by mouth daily, Disp: , Rfl:     cyanocobalamin 100 MCG tablet, Take 100 mcg by mouth daily, Disp: , Rfl:     escitalopram (LEXAPRO) 20 mg tablet, , Disp: , Rfl: 0    fluticasone (FLONASE) 50 mcg/act nasal spray, 1 spray into each nostril daily, Disp: , Rfl:     Lactobacillus (ACIDOPHILUS) 0 5 MG TABS, Take by mouth, Disp: , Rfl:     Multiple Vitamins-Minerals (MULTIVITAMIN WITH MINERALS) tablet, Take 1 tablet by mouth daily, Disp: , Rfl:     ALPRAZolam (XANAX) 0 5 mg tablet, , Disp: , Rfl: 0    amoxicillin (AMOXIL) 875 mg tablet, Take 1 tablet (875 mg total) by mouth 2 (two) times a day for 7 days, Disp: 14 tablet, Rfl: 0    EVEKEO 10 MG TABS, , Disp: , Rfl: 0    fluticasone (FLONASE) 50 mcg/act nasal spray, 1 spray into each nostril daily, Disp: 1 g, Rfl: 0    Current Allergies     Allergies as of 07/10/2021    (No Known Allergies)            The following portions of the patient's history were reviewed and updated as appropriate: allergies, current medications, past family history, past medical history, past social history, past surgical history and problem list      Past Medical History:   Diagnosis Date    Asthma     Autism     Fracture     Left arm    Reactive airway disease        Past Surgical History:   Procedure Laterality Date    NO PAST SURGERIES         No family history on file  Medications have been verified  Objective   /76   Pulse 101   Temp 98 1 °F (36 7 °C)   Resp 18   Ht 5' 11" (1 803 m)   Wt 87 5 kg (193 lb)   SpO2 98%   BMI 26 92 kg/m²   No LMP for male patient  Physical Exam     Physical Exam  Vitals and nursing note reviewed  Constitutional:       General: He is not in acute distress  Appearance: He is well-developed  He is not diaphoretic  HENT:      Head: Normocephalic and atraumatic  Right Ear: Hearing, tympanic membrane, ear canal and external ear normal       Left Ear: Hearing, tympanic membrane, ear canal and external ear normal       Nose: Mucosal edema, congestion and rhinorrhea present  Rhinorrhea is clear  Right Turbinates: Enlarged  Left Turbinates: Enlarged  Right Sinus: No maxillary sinus tenderness or frontal sinus tenderness  Left Sinus: No maxillary sinus tenderness or frontal sinus tenderness  Mouth/Throat:      Pharynx: Uvula midline  No oropharyngeal exudate, posterior oropharyngeal erythema or uvula swelling  Tonsils: No tonsillar abscesses  Cardiovascular:      Rate and Rhythm: Normal rate and regular rhythm  Heart sounds: S1 normal and S2 normal  Heart sounds not distant  No murmur heard  No friction rub  No gallop  No S3 or S4 sounds  Pulmonary:      Effort: No tachypnea, bradypnea, accessory muscle usage or respiratory distress  Breath sounds: No decreased breath sounds, wheezing, rhonchi or rales  Musculoskeletal:      Cervical back: Normal range of motion and neck supple  Lymphadenopathy:      Cervical: No cervical adenopathy  Neurological:      Mental Status: He is alert and oriented to person, place, and time     Psychiatric:         Behavior: Behavior normal

## 2021-07-30 ENCOUNTER — SOCIAL WORK (OUTPATIENT)
Dept: BEHAVIORAL/MENTAL HEALTH CLINIC | Facility: CLINIC | Age: 26
End: 2021-07-30
Payer: COMMERCIAL

## 2021-07-30 DIAGNOSIS — F84.0 AUTISTIC DISORDER, RESIDUAL STATE: Primary | ICD-10-CM

## 2021-07-30 PROCEDURE — 90834 PSYTX W PT 45 MINUTES: CPT

## 2021-07-30 NOTE — PSYCH
Psychotherapy Provided: Individual Psychotherapy 45 minutes     Length of time in session: 45 minutes, follow up in 2 week    Encounter Diagnosis     ICD-10-CM    1  Autistic disorder, residual state  F84 0        Goals addressed in session: Goal 1     Pain:      none    0    Current suicide risk : Low     D-ct shared that he passed his road test and he now has his license  Ct feels he is driving well alone but he is still taking lessons  Ct is finding when he is with his instructor he is feeling more pressure now that he has his license  Ct has been busy with cons  Ct is doing a [employer] photo shoot in [employer]  Ct mother does not want him to go and is going with him because she does not trust the city  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

## 2021-08-13 ENCOUNTER — SOCIAL WORK (OUTPATIENT)
Dept: BEHAVIORAL/MENTAL HEALTH CLINIC | Facility: CLINIC | Age: 26
End: 2021-08-13
Payer: COMMERCIAL

## 2021-08-13 DIAGNOSIS — F84.0 AUTISTIC DISORDER, RESIDUAL STATE: Primary | ICD-10-CM

## 2021-08-13 PROCEDURE — 90834 PSYTX W PT 45 MINUTES: CPT

## 2021-08-13 NOTE — PSYCH
Psychotherapy Provided: Individual Psychotherapy 45 minutes     Length of time in session: 45 minutes, follow up in 2 week    Encounter Diagnosis     ICD-10-CM    1  Autistic disorder, residual state  F84 0        Goals addressed in session: Goal 1     Pain:      none    0    Current suicide risk : Low     D- ct shared that he has been doing alright  Ct is frustrated because [employer] has not been helpful at all and ct is applying to jobs on his own  Ct also reports some frustration with mother  Ct feels that she talks down to him  Ct was made aware that maybe his mother thinks she is being helpful  Ct has been to some conventions and was at the [employer] with 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

## 2021-08-27 ENCOUNTER — SOCIAL WORK (OUTPATIENT)
Dept: BEHAVIORAL/MENTAL HEALTH CLINIC | Facility: CLINIC | Age: 26
End: 2021-08-27
Payer: COMMERCIAL

## 2021-08-27 DIAGNOSIS — F84.0 AUTISTIC DISORDER, RESIDUAL STATE: Primary | ICD-10-CM

## 2021-08-27 PROCEDURE — 90834 PSYTX W PT 45 MINUTES: CPT

## 2021-08-27 NOTE — PSYCH
Psychotherapy Provided: Individual Psychotherapy 45 minutes     Length of time in session: 45 minutes, follow up in 2 week    Encounter Diagnosis     ICD-10-CM    1  Autistic disorder, residual state  F84 0        Goals addressed in session: Goal 1     Pain:      none    0    Current suicide risk : Low     D- ct shared that he has a job interview at 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  Ct reported that his mother does not like that he does it  Ct has been busy otherwise online communicating with friends and working on other projects  A- ct presented with mild anxiety  Ct was verbal and engaged in session  Ct still struggles with holding a conversation  Ct feels he should come in to session and give me bullet points of the past 2 weeks  P- ct will attend session, take med's as instructed, and use techniques to manage anxiety and improve social skills  Behavioral Health Treatment Plan [facility]: Diagnosis and Treatment Plan explained to Duey Common relates understanding diagnosis and is agreeable to Treatment Plan   Yes

## 2021-09-24 ENCOUNTER — SOCIAL WORK (OUTPATIENT)
Dept: BEHAVIORAL/MENTAL HEALTH CLINIC | Facility: CLINIC | Age: 26
End: 2021-09-24
Payer: COMMERCIAL

## 2021-09-24 DIAGNOSIS — F84.0 AUTISTIC DISORDER, RESIDUAL STATE: Primary | ICD-10-CM

## 2021-09-24 PROCEDURE — 90834 PSYTX W PT 45 MINUTES: CPT

## 2021-09-24 NOTE — PSYCH
Psychotherapy Provided: Individual Psychotherapy 45 minutes     Length of time in session: 45 minutes, follow up in 2 week    Encounter Diagnosis     ICD-10-CM    1  Autistic disorder, residual state  F84 0        Goals addressed in session: Goal 1     Pain:      none    0    Current suicide risk : Low     D-ct shared that he got a job working for [employer]  Ct works part time and has worked 2 days this far  Ct reports that the job is OK  Ct was also offered another job after he started this one that he had to turn down  Ct denies issues at home  Ct is going to a con tomorrow and Sunday  Ct is looking forward to it and seeing his friends  Ct reports being social in person and on the Internet  A- ct presented with mild anxiety  Ct eye contact fair  Ct was verbal at times but lacked details  P- ct will attend session, take med's as instructed, and use techniques to improve social skills  Behavioral Health Treatment Plan [facility]: Diagnosis and Treatment Plan explained to Romeo Heck relates understanding diagnosis and is agreeable to Treatment Plan   Yes

## 2021-10-08 ENCOUNTER — TELEMEDICINE (OUTPATIENT)
Dept: BEHAVIORAL/MENTAL HEALTH CLINIC | Facility: CLINIC | Age: 26
End: 2021-10-08
Payer: COMMERCIAL

## 2021-10-08 DIAGNOSIS — F84.0 AUTISTIC DISORDER, RESIDUAL STATE: Primary | ICD-10-CM

## 2021-10-08 PROCEDURE — 90834 PSYTX W PT 45 MINUTES: CPT

## 2021-10-22 ENCOUNTER — TELEMEDICINE (OUTPATIENT)
Dept: BEHAVIORAL/MENTAL HEALTH CLINIC | Facility: CLINIC | Age: 26
End: 2021-10-22
Payer: COMMERCIAL

## 2021-10-22 DIAGNOSIS — F84.0 AUTISTIC DISORDER, RESIDUAL STATE: Primary | ICD-10-CM

## 2021-10-22 PROCEDURE — 90834 PSYTX W PT 45 MINUTES: CPT

## 2021-11-05 ENCOUNTER — SOCIAL WORK (OUTPATIENT)
Dept: BEHAVIORAL/MENTAL HEALTH CLINIC | Facility: CLINIC | Age: 26
End: 2021-11-05
Payer: COMMERCIAL

## 2021-11-05 DIAGNOSIS — F84.0 AUTISTIC DISORDER, RESIDUAL STATE: Primary | ICD-10-CM

## 2021-11-05 PROCEDURE — 90834 PSYTX W PT 45 MINUTES: CPT

## 2021-11-19 ENCOUNTER — SOCIAL WORK (OUTPATIENT)
Dept: BEHAVIORAL/MENTAL HEALTH CLINIC | Facility: CLINIC | Age: 26
End: 2021-11-19
Payer: COMMERCIAL

## 2021-11-19 DIAGNOSIS — F84.0 AUTISTIC DISORDER, RESIDUAL STATE: Primary | ICD-10-CM

## 2021-11-19 PROCEDURE — 90834 PSYTX W PT 45 MINUTES: CPT

## 2021-12-03 ENCOUNTER — SOCIAL WORK (OUTPATIENT)
Dept: BEHAVIORAL/MENTAL HEALTH CLINIC | Facility: CLINIC | Age: 26
End: 2021-12-03
Payer: COMMERCIAL

## 2021-12-03 DIAGNOSIS — F84.0 AUTISTIC DISORDER, RESIDUAL STATE: Primary | ICD-10-CM

## 2021-12-03 PROCEDURE — 90834 PSYTX W PT 45 MINUTES: CPT

## 2021-12-30 ENCOUNTER — HOSPITAL ENCOUNTER (EMERGENCY)
Facility: HOSPITAL | Age: 26
Discharge: HOME | End: 2021-12-30
Attending: EMERGENCY MEDICINE | Admitting: EMERGENCY MEDICINE
Payer: COMMERCIAL

## 2021-12-30 VITALS
DIASTOLIC BLOOD PRESSURE: 55 MMHG | WEIGHT: 180 LBS | SYSTOLIC BLOOD PRESSURE: 121 MMHG | OXYGEN SATURATION: 97 % | TEMPERATURE: 97.6 F | RESPIRATION RATE: 18 BRPM | HEIGHT: 71 IN | HEART RATE: 68 BPM | BODY MASS INDEX: 25.2 KG/M2

## 2021-12-30 DIAGNOSIS — U07.1 COVID-19: ICD-10-CM

## 2021-12-30 DIAGNOSIS — R19.7 NAUSEA VOMITING AND DIARRHEA: Primary | ICD-10-CM

## 2021-12-30 DIAGNOSIS — R11.2 NAUSEA VOMITING AND DIARRHEA: Primary | ICD-10-CM

## 2021-12-30 LAB
ALBUMIN SERPL BCP-MCNC: 4 G/DL (ref 3.5–5)
ALP SERPL-CCNC: 64 U/L (ref 46–116)
ALT SERPL W P-5'-P-CCNC: 38 U/L (ref 12–78)
ANION GAP SERPL CALCULATED.3IONS-SCNC: 5 MMOL/L (ref 4–13)
AST SERPL W P-5'-P-CCNC: 29 U/L (ref 5–45)
BASOPHILS # BLD AUTO: 0.01 THOUSANDS/ΜL (ref 0–0.1)
BASOPHILS NFR BLD AUTO: 0 % (ref 0–1)
BILIRUB SERPL-MCNC: 0.34 MG/DL (ref 0.2–1)
BUN SERPL-MCNC: 16 MG/DL (ref 5–25)
CALCIUM SERPL-MCNC: 8.6 MG/DL (ref 8.3–10.1)
CHLORIDE SERPL-SCNC: 105 MMOL/L (ref 100–108)
CO2 SERPL-SCNC: 33 MMOL/L (ref 21–32)
CREAT SERPL-MCNC: 0.96 MG/DL (ref 0.6–1.3)
EOSINOPHIL # BLD AUTO: 0 THOUSAND/ΜL (ref 0–0.61)
EOSINOPHIL NFR BLD AUTO: 0 % (ref 0–6)
ERYTHROCYTE [DISTWIDTH] IN BLOOD BY AUTOMATED COUNT: 12.3 % (ref 11.6–15.1)
GFR SERPL CREATININE-BSD FRML MDRD: 110 ML/MIN/1.73SQ M
GLUCOSE SERPL-MCNC: 87 MG/DL (ref 65–140)
HCT VFR BLD AUTO: 50.3 % (ref 36.5–49.3)
HGB BLD-MCNC: 16.7 G/DL (ref 12–17)
IMM GRANULOCYTES # BLD AUTO: 0 THOUSAND/UL (ref 0–0.2)
IMM GRANULOCYTES NFR BLD AUTO: 0 % (ref 0–2)
LIPASE SERPL-CCNC: 575 U/L (ref 73–393)
LYMPHOCYTES # BLD AUTO: 1.96 THOUSANDS/ΜL (ref 0.6–4.47)
LYMPHOCYTES NFR BLD AUTO: 57 % (ref 14–44)
MCH RBC QN AUTO: 31.7 PG (ref 26.8–34.3)
MCHC RBC AUTO-ENTMCNC: 33.2 G/DL (ref 31.4–37.4)
MCV RBC AUTO: 95 FL (ref 82–98)
MONOCYTES # BLD AUTO: 0.46 THOUSAND/ΜL (ref 0.17–1.22)
MONOCYTES NFR BLD AUTO: 13 % (ref 4–12)
NEUTROPHILS # BLD AUTO: 1.06 THOUSANDS/ΜL (ref 1.85–7.62)
NEUTS SEG NFR BLD AUTO: 30 % (ref 43–75)
NRBC BLD AUTO-RTO: 0 /100 WBCS
PLATELET # BLD AUTO: 102 THOUSANDS/UL (ref 149–390)
PMV BLD AUTO: 10.2 FL (ref 8.9–12.7)
POTASSIUM SERPL-SCNC: 3.9 MMOL/L (ref 3.5–5.3)
PROT SERPL-MCNC: 7.7 G/DL (ref 6.4–8.2)
RBC # BLD AUTO: 5.27 MILLION/UL (ref 3.88–5.62)
SODIUM SERPL-SCNC: 143 MMOL/L (ref 136–145)
WBC # BLD AUTO: 3.49 THOUSAND/UL (ref 4.31–10.16)

## 2021-12-30 PROCEDURE — 99284 EMERGENCY DEPT VISIT MOD MDM: CPT | Performed by: PHYSICIAN ASSISTANT

## 2021-12-30 PROCEDURE — 80053 COMPREHEN METABOLIC PANEL: CPT | Performed by: PHYSICIAN ASSISTANT

## 2021-12-30 PROCEDURE — 96374 THER/PROPH/DIAG INJ IV PUSH: CPT

## 2021-12-30 PROCEDURE — 36415 COLL VENOUS BLD VENIPUNCTURE: CPT | Performed by: PHYSICIAN ASSISTANT

## 2021-12-30 PROCEDURE — 83690 ASSAY OF LIPASE: CPT | Performed by: PHYSICIAN ASSISTANT

## 2021-12-30 PROCEDURE — 85025 COMPLETE CBC W/AUTO DIFF WBC: CPT | Performed by: PHYSICIAN ASSISTANT

## 2021-12-30 PROCEDURE — 99283 EMERGENCY DEPT VISIT LOW MDM: CPT

## 2021-12-30 RX ORDER — ONDANSETRON 4 MG/1
4 TABLET, ORALLY DISINTEGRATING ORAL EVERY 8 HOURS PRN
Qty: 5 TABLET | Refills: 0 | Status: SHIPPED | OUTPATIENT
Start: 2021-12-30

## 2021-12-30 RX ORDER — ONDANSETRON 2 MG/ML
4 INJECTION INTRAMUSCULAR; INTRAVENOUS ONCE
Status: COMPLETED | OUTPATIENT
Start: 2021-12-30 | End: 2021-12-30

## 2021-12-30 RX ADMIN — ONDANSETRON 4 MG: 2 INJECTION INTRAMUSCULAR; INTRAVENOUS at 14:32

## 2021-12-30 NOTE — DISCHARGE INSTRUCTIONS
Use Tylenol every 4 hours or Motrin every 6 hours; you can alternate the 2 medications taking something every 3 hours for pain or fever  Take oral Zofran as needed for nausea/vomiting,  Use BRAT (bananas, rice, apples, toast) for diarrhea    If no improvement follow-up with your doctor in next few days

## 2021-12-30 NOTE — ED PROVIDER NOTES
History  Chief Complaint   Patient presents with    Flu Symptoms     congestion n/v/d since tuesday, exposed to dad who is covid+     Pt with exposure to COVID + person, his father and  took home antigen test which was +  Presents to ED c/o nausea, greenish vomiting, non-bloody diarrhea over the past 24 hrs, no abd pain, did tolerate some orals today in am   NO fever,no cp, no sob, no urinary complaints          Prior to Admission Medications   Prescriptions Last Dose Informant Patient Reported? Taking? ALPRAZolam (XANAX) 0 5 mg tablet   Yes No   Patient not taking: Reported on 7/10/2021   EVEKEO 10 MG TABS   Yes No   Patient not taking: Reported on 7/10/2021   Lactobacillus (ACIDOPHILUS) 0 5 MG TABS   Yes No   Sig: Take by mouth   Multiple Vitamins-Minerals (MULTIVITAMIN WITH MINERALS) tablet   Yes No   Sig: Take 1 tablet by mouth daily   cholecalciferol (VITAMIN D3) 1,000 units tablet   Yes No   Sig: Take 1,000 Units by mouth daily   cyanocobalamin 100 MCG tablet   Yes No   Sig: Take 100 mcg by mouth daily   escitalopram (LEXAPRO) 20 mg tablet   Yes No   fluticasone (FLONASE) 50 mcg/act nasal spray   Yes No   Si spray into each nostril daily   fluticasone (FLONASE) 50 mcg/act nasal spray   No No   Si spray into each nostril daily      Facility-Administered Medications: None       Past Medical History:   Diagnosis Date    Asthma     Autism     Fracture     Left arm    Reactive airway disease        Past Surgical History:   Procedure Laterality Date    NO PAST SURGERIES         History reviewed  No pertinent family history  I have reviewed and agree with the history as documented  E-Cigarette/Vaping     E-Cigarette/Vaping Substances     Social History     Tobacco Use    Smoking status: Never Smoker    Smokeless tobacco: Never Used   Substance Use Topics    Alcohol use: Not on file    Drug use: Not on file       Review of Systems   Constitutional: Negative for chills and fever     HENT: Positive for congestion  Negative for hearing loss, rhinorrhea, sore throat and trouble swallowing  Eyes: Negative for visual disturbance  Respiratory: Negative for cough and shortness of breath  Cardiovascular: Negative for chest pain  Gastrointestinal: Positive for diarrhea, nausea and vomiting  Negative for abdominal pain  Genitourinary: Negative for dysuria and frequency  Musculoskeletal: Negative for arthralgias and myalgias  Skin: Negative for color change and pallor  Neurological: Negative for dizziness, weakness and headaches  Psychiatric/Behavioral: Negative for behavioral problems  All other systems reviewed and are negative  Physical Exam  Physical Exam  Vitals and nursing note reviewed  Constitutional:       General: He is not in acute distress  Appearance: Normal appearance  He is well-developed  HENT:      Head: Normocephalic and atraumatic  Right Ear: External ear normal       Left Ear: External ear normal       Nose: Nose normal       Mouth/Throat:      Mouth: Mucous membranes are moist       Pharynx: Oropharynx is clear  Eyes:      Conjunctiva/sclera: Conjunctivae normal    Cardiovascular:      Rate and Rhythm: Normal rate and regular rhythm  Pulmonary:      Effort: Pulmonary effort is normal  No respiratory distress  Breath sounds: Normal breath sounds  Abdominal:      General: Bowel sounds are normal       Palpations: Abdomen is soft  Tenderness: There is no abdominal tenderness  There is no right CVA tenderness or left CVA tenderness  Musculoskeletal:         General: Normal range of motion  Cervical back: Normal range of motion  Right lower leg: No edema  Left lower leg: No edema  Skin:     General: Skin is warm and dry  Findings: No rash  Neurological:      Mental Status: He is alert and oriented to person, place, and time     Psychiatric:         Behavior: Behavior normal          Vital Signs  ED Triage Vitals [12/30/21 1349]   Temperature Pulse Respirations Blood Pressure SpO2   97 6 °F (36 4 °C) 68 18 121/55 97 %      Temp src Heart Rate Source Patient Position - Orthostatic VS BP Location FiO2 (%)   -- -- -- -- --      Pain Score       No Pain           Vitals:    12/30/21 1349   BP: 121/55   Pulse: 68         Visual Acuity      ED Medications  Medications   ondansetron (ZOFRAN) injection 4 mg (4 mg Intravenous Given 12/30/21 1432)       Diagnostic Studies  Results Reviewed     Procedure Component Value Units Date/Time    Comprehensive metabolic panel [[id number]]  (Abnormal) Collected: 12/30/21 1432    Lab Status: Final result Specimen: Blood from Arm, Right Updated: 12/30/21 1454     Sodium 143 mmol/L      Potassium 3 9 mmol/L      Chloride 105 mmol/L      CO2 33 mmol/L      ANION GAP 5 mmol/L      BUN 16 mg/dL      Creatinine 0 96 mg/dL      Glucose 87 mg/dL      Calcium 8 6 mg/dL      AST 29 U/L      ALT 38 U/L      Alkaline Phosphatase 64 U/L      Total Protein 7 7 g/dL      Albumin 4 0 g/dL      Total Bilirubin 0 34 mg/dL      eGFR 110 ml/min/1 73sq m     Narrative:      [place] guidelines for Chronic Kidney Disease (CKD):     Stage 1 with normal or high GFR (GFR > 90 mL/min/1 73 square meters)    Stage 2 Mild CKD (GFR = 60-89 mL/min/1 73 square meters)    Stage 3A Moderate CKD (GFR = 45-59 mL/min/1 73 square meters)    Stage 3B Moderate CKD (GFR = 30-44 mL/min/1 73 square meters)    Stage 4 Severe CKD (GFR = 15-29 mL/min/1 73 square meters)    Stage 5 End Stage CKD (GFR <15 mL/min/1 73 square meters)  Note: GFR calculation is accurate only with a steady state creatinine    Lipase [[id number]]  (Abnormal) Collected: 12/30/21 1432    Lab Status: Final result Specimen: Blood from Arm, Right Updated: 12/30/21 1454     Lipase 575 u/L     CBC and differential [[id number]]  (Abnormal) Collected: 12/30/21 1432    Lab Status: Final result Specimen: Blood from Arm, Right Updated: 12/30/21 1439     WBC 3 49 Thousand/uL      RBC 5 27 Million/uL      Hemoglobin 16 7 g/dL      Hematocrit 50 3 %      MCV 95 fL      MCH 31 7 pg      MCHC 33 2 g/dL      RDW 12 3 %      MPV 10 2 fL      Platelets 519 Thousands/uL      nRBC 0 /100 WBCs      Neutrophils Relative 30 %      Immat GRANS % 0 %      Lymphocytes Relative 57 %      Monocytes Relative 13 %      Eosinophils Relative 0 %      Basophils Relative 0 %      Neutrophils Absolute 1 06 Thousands/µL      Immature Grans Absolute 0 00 Thousand/uL      Lymphocytes Absolute 1 96 Thousands/µL      Monocytes Absolute 0 46 Thousand/µL      Eosinophils Absolute 0 00 Thousand/µL      Basophils Absolute 0 01 Thousands/µL                  No orders to display              Procedures  Procedures         ED Course                               SBIRT 22yo+      Most Recent Value   SBIRT (24 yo +)    In order to provide better care to our patients, we are screening all of our patients for alcohol and drug use  Would it be okay to ask you these screening questions? No Filed at: 12/30/2021 1353                    MDM  Number of Diagnoses or Management Options  Diagnosis management comments: Pt feeling better, reviewed labs with attending who agrees with plan to discharge on ODT Zofran, follow-up PCP as needed       Amount and/or Complexity of Data Reviewed  Clinical lab tests: ordered and reviewed        Disposition  Final diagnoses:   Nausea vomiting and diarrhea   COVID-19     Time reflects when diagnosis was documented in both MDM as applicable and the Disposition within this note     Time User Action Codes Description Comment    12/30/2021  3:51 PM Aisha Adame [R11 2,  R19 7] Nausea vomiting and diarrhea     12/30/2021  3:51 PM Manuel [address]  [U07 1] COVID-19       ED Disposition     ED Disposition Condition Date/Time Comment    Discharge Stable u Dec 30, 2021  3:50 PM Candie Barajas discharge to home/self care              Follow-up Information     Follow up With Specialties Details Why Contact Info    Natali Maradiaga MD   As needed [address]            Patient's Medications   Discharge Prescriptions    ONDANSETRON (ZOFRAN ODT) 4 MG DISINTEGRATING TABLET    Take 1 tablet (4 mg total) by mouth every 8 (eight) hours as needed for nausea or vomiting       Start Date: 12/30/2021End Date: --       Order Dose: 4 mg       Quantity: 5 tablet    Refills: 0       No discharge procedures on file      PDMP Review     None          ED Provider  Electronically Signed by           Nelida Rodgers PA-C  12/30/21 5847

## 2022-01-14 ENCOUNTER — SOCIAL WORK (OUTPATIENT)
Dept: BEHAVIORAL/MENTAL HEALTH CLINIC | Facility: CLINIC | Age: 27
End: 2022-01-14
Payer: COMMERCIAL

## 2022-01-14 DIAGNOSIS — F84.0 AUTISTIC DISORDER, RESIDUAL STATE: Primary | ICD-10-CM

## 2022-01-14 PROCEDURE — 90834 PSYTX W PT 45 MINUTES: CPT

## 2022-01-14 NOTE — BH TREATMENT PLAN
Lance Kim  1995       Date of Initial Treatment Plan: 7/17/2020   Date of Current Treatment Plan: 01/14/22    Treatment Plan Number 4     Strengths/Personal Resources for Self Care: self sifficient    Diagnosis:   1  Autistic disorder, residual state         Area of Needs: socialskills      Long Term Goal 1: Act will work on getting out of social comfort zone    Target Date: N/A  Completion Date: N/A         Short Term Objectives for Goal 1: Act will make an effort to interact out of familiar environments, Bct will be cognizant of good eye contact and Cct will try and talk with more females to become more comfortable  Long Term Goal 2: N/A    Target Date: N/A  Completion Date: N/A    Short Term Objectives for Goal 2: N/A         Long Term Goal # 3: N/A     Target Date: N/A  Completion Date: N/A    Short Term Objectives for Goal 3: N/A    GOAL 1: Modality: Individual 2x per month   Completion Date 6/14/2022      Behavioral Health Treatment Plan [facility]: Diagnosis and Treatment Plan explained to Natalie Tapia relates understanding diagnosis and is agreeable to Treatment Plan         Client Comments : Please share your thoughts, feelings, need and/or experiences regarding your treatment plan: ct agreed

## 2022-01-14 NOTE — PSYCH
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

## 2022-01-28 ENCOUNTER — SOCIAL WORK (OUTPATIENT)
Dept: BEHAVIORAL/MENTAL HEALTH CLINIC | Facility: CLINIC | Age: 27
End: 2022-01-28
Payer: COMMERCIAL

## 2022-01-28 DIAGNOSIS — F84.0 AUTISTIC DISORDER, RESIDUAL STATE: Primary | ICD-10-CM

## 2022-01-28 PROCEDURE — 90834 PSYTX W PT 45 MINUTES: CPT

## 2022-01-28 NOTE — PSYCH
Psychotherapy Provided: 45 minutes Psychotherapy     Length of time in session: 45 minutes, follow up in 2 week    Encounter Diagnosis     ICD-10-CM    1  Autistic disorder, residual state  F84 0        Goals addressed in session: Goal 1     Pain:      none    0    Current suicide risk : Low     D- ct shared that he is stable  Ct returned to work 2 weeks ago after being out a little over a week due to Covid  Ct is keeping busy at home by learning to do some cooking  Ct is also working on creative projects with friends  Ct is going to a [employer] convention next weekend that he is looking forward to  Ct is also concerned about a friend who is going through some difficult times  A- ct presented with mild anxiety  Ct was engaged in session but not very verbal   Ct sleep is adequate  P- ct will attend session, take med's as instructed, and use techniques to manage anxiety and improve social skills  Behavioral Health Treatment Plan [facility]: Diagnosis and Treatment Plan explained to Roya Bangura relates understanding diagnosis and is agreeable to Treatment Plan   Yes

## 2022-02-25 ENCOUNTER — TELEMEDICINE (OUTPATIENT)
Dept: BEHAVIORAL/MENTAL HEALTH CLINIC | Facility: CLINIC | Age: 27
End: 2022-02-25
Payer: COMMERCIAL

## 2022-02-25 DIAGNOSIS — F84.0 AUTISTIC DISORDER, RESIDUAL STATE: Primary | ICD-10-CM

## 2022-02-25 PROCEDURE — 90834 PSYTX W PT 45 MINUTES: CPT

## 2022-02-25 NOTE — PSYCH
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  Ct denies issues at home with parents  Ct would like to talk to parents about moving out but does not know how to address it yet  Ct still needs more money saved before he can do that  A- ct presented with mild anxiety  Ct eye contact on amwell was a little better then in person but was still noticeable  Ct was verbal and engaged in session  P- ct will attend session, take med's as instructed, and use techniques to manage moods and improve social skills  Behavioral Health Treatment Plan [facility]: Diagnosis and Treatment Plan explained to Sabra July relates understanding diagnosis and is agreeable to Treatment Plan  Yes     Virtual Regular Visit    Verification of patient location:    Patient is located in the following state in which I hold an active license [place]      Assessment/Plan:    Problem List Items Addressed This Visit     None      Visit Diagnoses     Autistic disorder, residual state    -  Primary          Goals addressed in session: Goal 1          Reason for visit is   Chief Complaint   Patient presents with    Virtual Regular Visit        Encounter provider [facility]sh[facility]    Provider located at [address]  [address]  [place] [address]  [phone]      Recent Visits  No visits were found meeting these conditions    Showing recent visits within past 7 days and meeting all other requirements  Future Appointments  No visits were found meeting these conditions  Showing future appointments within next 150 days and meeting all other requirements       The patient was identified by name and date of birth  Hamlin Riaz was informed that this is a telemedicine visit and that the visit is being conducted [employer] and patient was informed that this is a secure, HIPAA-compliant platform  He agrees to proceed     My office door was closed  No one else was in the room  He acknowledged consent and understanding of privacy and security of the video platform  The patient has agreed to participate and understands they can discontinue the visit at any time  Patient is aware this is a billable service  VIRTUAL VISIT DISCLAIMER    Yakelin Mello verbally agrees to participate in [employer]  Pt is aware that [employer] could be limited without vital signs or the ability to perform a full hands-on physical exam  Frandy Pitts understands he or the provider may request at any time to terminate the video visit and request the patient to seek care or treatment in person

## 2022-03-11 ENCOUNTER — SOCIAL WORK (OUTPATIENT)
Dept: BEHAVIORAL/MENTAL HEALTH CLINIC | Facility: CLINIC | Age: 27
End: 2022-03-11
Payer: COMMERCIAL

## 2022-03-11 DIAGNOSIS — F84.0 AUTISTIC DISORDER, RESIDUAL STATE: Primary | ICD-10-CM

## 2022-03-11 PROCEDURE — 90834 PSYTX W PT 45 MINUTES: CPT

## 2022-03-11 NOTE — PSYCH
Psychotherapy Provided: Individual Psychotherapy 45 minutes     Length of time in session: 45 minutes, follow up in 2 week    Encounter Diagnosis     ICD-10-CM    1  Autistic disorder, residual state  F84 0        Goals addressed in session: Goal 1     Pain:      none    0    Current suicide risk : Low     D- ct shared that he is going to a convention at a toy fair tomorrow with his [employer] group  Ct is looking forward to it  Ct went to a horror con last week and met with and spoke to a director of a [employer] movie  Ct reports that he is doing well at work  Ct denies any issues at home  Ct feels he is self aware of his social skill weaknesses  Ct feels he needs to work on processing information faster so he can respond appropriately  A-ct present with mild anxiety  Ct was verbal and engaged in session  Ct sleep is adequate  P- ct will attend session, take med's instructed, and use techniques to improve and work on social skills  Behavioral Health Treatment Plan [facility]: Diagnosis and Treatment Plan explained to Oj Delgado relates understanding diagnosis and is agreeable to Treatment Plan   Yes

## 2022-03-25 ENCOUNTER — SOCIAL WORK (OUTPATIENT)
Dept: BEHAVIORAL/MENTAL HEALTH CLINIC | Facility: CLINIC | Age: 27
End: 2022-03-25
Payer: COMMERCIAL

## 2022-03-25 DIAGNOSIS — F84.0 AUTISTIC DISORDER, RESIDUAL STATE: Primary | ICD-10-CM

## 2022-03-25 PROCEDURE — 90834 PSYTX W PT 45 MINUTES: CPT

## 2022-03-25 NOTE — PSYCH
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

## 2022-04-08 ENCOUNTER — SOCIAL WORK (OUTPATIENT)
Dept: BEHAVIORAL/MENTAL HEALTH CLINIC | Facility: CLINIC | Age: 27
End: 2022-04-08
Payer: COMMERCIAL

## 2022-04-08 DIAGNOSIS — F84.0 AUTISTIC DISORDER, RESIDUAL STATE: Primary | ICD-10-CM

## 2022-04-08 PROCEDURE — 90834 PSYTX W PT 45 MINUTES: CPT

## 2022-04-08 NOTE — PSYCH
Psychotherapy Provided: Individual Psychotherapy 45 minutes     Length of time in session: 45 minutes, follow up in 2 week    Encounter Diagnosis     ICD-10-CM    1  Autistic disorder, residual state  F84 0        Goals addressed in session: Goal 1     Pain:      none    0    Current suicide risk : Low     D- ct shared that he has been doing well at work although he realized that he has a dust allergy  Ct reports trying to be more independent at home in preparation for moving out  Ct has no time frame on moving out  Ct pays some of his bills but only a couple  Ct reports that he is active socially  Ct and parents will visit relatives over [place] weekend  A- ct presented with mild anxiety  Ct was engaged in session and more verbal then usual   Ct sleep is adequate  P- ct will attend session, take med's a instructed, and use techniques to manage moods  Behavioral Health Treatment Plan [facility]: Diagnosis and Treatment Plan explained to Haze Hodgkin relates understanding diagnosis and is agreeable to Treatment Plan   Yes

## 2022-04-22 ENCOUNTER — SOCIAL WORK (OUTPATIENT)
Dept: BEHAVIORAL/MENTAL HEALTH CLINIC | Facility: CLINIC | Age: 27
End: 2022-04-22
Payer: COMMERCIAL

## 2022-04-22 DIAGNOSIS — F84.0 AUTISTIC DISORDER, RESIDUAL STATE: Primary | ICD-10-CM

## 2022-04-22 PROCEDURE — 90834 PSYTX W PT 45 MINUTES: CPT

## 2022-04-22 NOTE — PSYCH
Psychotherapy Provided: Individual Psychotherapy 45 minutes     Length of time in session: 45 minutes, follow up in 2 week    Encounter Diagnosis     ICD-10-CM    1  Autistic disorder, residual state  F84 0        Goals addressed in session: Goal 1     Pain:      none    0    Current suicide risk : Low     D- ct shared that he was upset because he went to apply for a job and the post was taken down  Ct will keep an eye on company postings  Ct is still working and doing well  Ct reports that he spent a weekend with family for [employer] and that it went well and that he handles socialization much better then he used to years ago  Ct will be going to a convention in the next 2 weeks and he is preparing for that  Ct denies issues at home  A- ct was mildly anxious  Ct was verbal and engaged in session  Ct sleep is adequate  P- ct will attend session, take med's as instructed, and use techniques to manage moods  Behavioral Health Treatment Plan [facility]: Diagnosis and Treatment Plan explained to Sarah Goyal relates understanding diagnosis and is agreeable to Treatment Plan   Yes

## 2022-05-06 ENCOUNTER — SOCIAL WORK (OUTPATIENT)
Dept: BEHAVIORAL/MENTAL HEALTH CLINIC | Facility: CLINIC | Age: 27
End: 2022-05-06
Payer: COMMERCIAL

## 2022-05-06 DIAGNOSIS — F84.0 AUTISTIC DISORDER, RESIDUAL STATE: Primary | ICD-10-CM

## 2022-05-06 PROCEDURE — 90834 PSYTX W PT 45 MINUTES: CPT

## 2022-05-06 NOTE — PSYCH
Psychotherapy Provided: Individual Psychotherapy 45 minutes     Length of time in session: 45 minutes, follow up in 2 week    Encounter Diagnosis     ICD-10-CM    1  Autistic disorder, residual state  F84 0        Goals addressed in session: Goal 1     Pain:      none    0    Current suicide risk : Low     D- ct shared that he has been doing well at work and he eventually will increase his hours  Ct will lose his Social security disability but ct realizes that he will make more money working then if he stayed on disability  Ct went to a con 2 weeks ago and met Anabella Jennings  Ct had pictures on his phone  Ct does not have any cons coming up  Ct is looking forward to seeing new [employer] movie later today  Ct was stressed about a bolt that fell off of his car that secured an item  Ct will take it to the garage next week for a repair  A- ct presented with mild anxiety  Ct reports that he is sleeping well and keeping busy with work and projects  Ct is not sure of his plans for mothers day  P- ct will attend session, take med's as instructed, and use techniques to manage moods and improve social skills  Behavioral Health Treatment Plan [facility]: Diagnosis and Treatment Plan explained to Ernie Quiroz relates understanding diagnosis and is agreeable to Treatment Plan   Yes

## 2022-06-03 ENCOUNTER — SOCIAL WORK (OUTPATIENT)
Dept: BEHAVIORAL/MENTAL HEALTH CLINIC | Facility: CLINIC | Age: 27
End: 2022-06-03
Payer: COMMERCIAL

## 2022-06-03 DIAGNOSIS — F84.0 AUTISTIC DISORDER, RESIDUAL STATE: Primary | ICD-10-CM

## 2022-06-03 PROCEDURE — 90834 PSYTX W PT 45 MINUTES: CPT

## 2022-06-03 NOTE — PSYCH
Psychotherapy Provided: Individual Psychotherapy 45 minutes     Length of time in session: 45 minutes, follow up in 2 week    Encounter Diagnosis     ICD-10-CM    1  Autistic disorder, residual state  F84 0        Goals addressed in session: Goal 1     Pain:      none    0    Current suicide risk : Low     D- ct shared that he has been doing well at work and at home  Ct has been going to conventions with his groups and performing in costume  Ct is working without issues and saving money  Ct reports that mother has been low key and ct spends a lot of time out of the house  Ct will stop counseling at this time  We both agreed  A- none-   P- ct will stop counseling at this time     [address]: Diagnosis and Treatment Plan explained to Windy Shrestha relates understanding diagnosis and is agreeable to Treatment Plan   Yes

## 2022-09-21 ENCOUNTER — DOCUMENTATION (OUTPATIENT)
Dept: BEHAVIORAL/MENTAL HEALTH CLINIC | Facility: CLINIC | Age: 27
End: 2022-09-21

## 2022-09-21 NOTE — PROGRESS NOTES
Assessment/Plan:      There are no diagnoses linked to this encounter  Subjective:     Patient ID: Viviana Meeks is a 24 y o  male  Outpatient Discharge Summary:   Admission Date: 7/17/2020  Lisa Maya was referred by self  Discharge Date: 9/21/2022    Discharge Diagnosis:    No diagnosis found      Treating Physician: unknown  Treatment Complications: none  Presenting Problem: social/school  Course of treatment includes:    medication management and individual therapy   Treatment Progress: fair  Criteria for Discharge: completed treatment goals and objectives and is no longer in need of services  Aftercare recommendations include medication and follow up as needed  Discharge Medications include:  Current Outpatient Medications:     ALPRAZolam (XANAX) 0 5 mg tablet, , Disp: , Rfl: 0    cholecalciferol (VITAMIN D3) 1,000 units tablet, Take 1,000 Units by mouth daily, Disp: , Rfl:     cyanocobalamin 100 MCG tablet, Take 100 mcg by mouth daily, Disp: , Rfl:     escitalopram (LEXAPRO) 20 mg tablet, , Disp: , Rfl: 0    EVEKEO 10 MG TABS, , Disp: , Rfl: 0    fluticasone (FLONASE) 50 mcg/act nasal spray, 1 spray into each nostril daily, Disp: , Rfl:     fluticasone (FLONASE) 50 mcg/act nasal spray, 1 spray into each nostril daily, Disp: 1 g, Rfl: 0    Lactobacillus (ACIDOPHILUS) 0 5 MG TABS, Take by mouth, Disp: , Rfl:     Multiple Vitamins-Minerals (MULTIVITAMIN WITH MINERALS) tablet, Take 1 tablet by mouth daily, Disp: , Rfl:     ondansetron (Zofran ODT) 4 mg disintegrating tablet, Take 1 tablet (4 mg total) by mouth every 8 (eight) hours as needed for nausea or vomiting, Disp: 5 tablet, Rfl: 0    Prognosis: fair

## 2023-10-27 ENCOUNTER — NEW PATIENT (OUTPATIENT)
Dept: URBAN - METROPOLITAN AREA CLINIC 6 | Facility: CLINIC | Age: 28
End: 2023-10-27

## 2023-10-27 DIAGNOSIS — H50.10: ICD-10-CM

## 2023-10-27 PROCEDURE — 92060 SENSORIMOTOR EXAMINATION: CPT

## 2023-10-27 PROCEDURE — 92004 COMPRE OPH EXAM NEW PT 1/>: CPT

## 2023-10-27 ASSESSMENT — VISUAL ACUITY
OS_CC: 20/25-1
OD_CC: 20/25-1

## 2023-10-27 ASSESSMENT — TONOMETRY
OD_IOP_MMHG: 18
OS_IOP_MMHG: 17

## 2024-03-17 ENCOUNTER — APPOINTMENT (EMERGENCY)
Dept: RADIOLOGY | Facility: HOSPITAL | Age: 29
End: 2024-03-17
Payer: COMMERCIAL

## 2024-03-17 ENCOUNTER — HOSPITAL ENCOUNTER (EMERGENCY)
Facility: HOSPITAL | Age: 29
Discharge: HOME | End: 2024-03-17
Attending: EMERGENCY MEDICINE | Admitting: EMERGENCY MEDICINE
Payer: COMMERCIAL

## 2024-03-17 VITALS
DIASTOLIC BLOOD PRESSURE: 67 MMHG | RESPIRATION RATE: 16 BRPM | OXYGEN SATURATION: 97 % | HEART RATE: 68 BPM | BODY MASS INDEX: 27.95 KG/M2 | SYSTOLIC BLOOD PRESSURE: 110 MMHG | TEMPERATURE: 98.4 F | WEIGHT: 200.4 LBS

## 2024-03-17 DIAGNOSIS — S93.409A ANKLE SPRAIN: Primary | ICD-10-CM

## 2024-03-17 PROCEDURE — 73610 X-RAY EXAM OF ANKLE: CPT

## 2024-03-17 PROCEDURE — 99284 EMERGENCY DEPT VISIT MOD MDM: CPT | Performed by: EMERGENCY MEDICINE

## 2024-03-17 PROCEDURE — 99283 EMERGENCY DEPT VISIT LOW MDM: CPT

## 2024-03-17 RX ORDER — ALBUTEROL SULFATE 90 UG/1
2 AEROSOL, METERED RESPIRATORY (INHALATION) EVERY 6 HOURS PRN
COMMUNITY

## 2024-03-17 NOTE — Clinical Note
Frandy Castellanos was seen and treated in our emergency department on 3/17/2024.                Diagnosis:     Frandy  may return to work on return date.    He may return on this date: 03/25/2024         If you have any questions or concerns, please don't hesitate to call.      Beryl Fonseca, DO    ______________________________           _______________          _______________  Hospital Representative                              Date                                Time

## 2024-03-18 NOTE — ED PROVIDER NOTES
History  Chief Complaint   Patient presents with    Ankle Injury     States tripped over a rock today and injured R ankle/foot area. Did not fall down     28-year-old male presents with right ankle pain after he rolled it today.  Painful ambulation no other injuries or complaints      History provided by:  Patient   used: No        Prior to Admission Medications   Prescriptions Last Dose Informant Patient Reported? Taking?   ALPRAZolam (XANAX) 0.5 mg tablet   Yes No   Patient not taking: Reported on 7/10/2021   EVEKEO 10 MG TABS   Yes No   Patient not taking: Reported on 7/10/2021   Lactobacillus (ACIDOPHILUS) 0.5 MG TABS Not Taking  Yes No   Sig: Take by mouth   Patient not taking: Reported on 3/17/2024   Multiple Vitamins-Minerals (MULTIVITAMIN WITH MINERALS) tablet   Yes No   Sig: Take 1 tablet by mouth daily   albuterol (PROVENTIL HFA,VENTOLIN HFA) 90 mcg/act inhaler   Yes Yes   Sig: Inhale 2 puffs every 6 (six) hours as needed for wheezing   cholecalciferol (VITAMIN D3) 1,000 units tablet   Yes No   Sig: Take 1,000 Units by mouth daily   cyanocobalamin 100 MCG tablet   Yes No   Sig: Take 100 mcg by mouth daily   escitalopram (LEXAPRO) 20 mg tablet   Yes No   fluticasone (FLONASE) 50 mcg/act nasal spray   Yes No   Si spray into each nostril daily   fluticasone (FLONASE) 50 mcg/act nasal spray   No No   Si spray into each nostril daily   ondansetron (Zofran ODT) 4 mg disintegrating tablet Not Taking  No No   Sig: Take 1 tablet (4 mg total) by mouth every 8 (eight) hours as needed for nausea or vomiting   Patient not taking: Reported on 3/17/2024      Facility-Administered Medications: None       Past Medical History:   Diagnosis Date    Asthma     Autism     Fracture     Left arm    Reactive airway disease        Past Surgical History:   Procedure Laterality Date    APPENDECTOMY      COLON SURGERY      NO PAST SURGERIES         History reviewed. No pertinent family history.  I have  reviewed and agree with the history as documented.    E-Cigarette/Vaping     E-Cigarette/Vaping Substances     Social History     Tobacco Use    Smoking status: Never    Smokeless tobacco: Never       Review of Systems   Constitutional: Negative.    HENT: Negative.     Eyes: Negative.    Respiratory: Negative.     Cardiovascular: Negative.    Gastrointestinal: Negative.    Endocrine: Negative.    Genitourinary: Negative.    Musculoskeletal:  Positive for arthralgias, joint swelling and myalgias. Negative for gait problem.   Skin: Negative.    Allergic/Immunologic: Negative.    Hematological: Negative.    Psychiatric/Behavioral: Negative.     All other systems reviewed and are negative.      Physical Exam  Physical Exam  Vitals and nursing note reviewed.   Constitutional:       Appearance: Normal appearance.   HENT:      Head: Normocephalic and atraumatic.      Nose: Nose normal.      Mouth/Throat:      Mouth: Mucous membranes are moist.   Eyes:      Extraocular Movements: Extraocular movements intact.      Pupils: Pupils are equal, round, and reactive to light.   Cardiovascular:      Rate and Rhythm: Normal rate and regular rhythm.   Pulmonary:      Effort: Pulmonary effort is normal.      Breath sounds: Normal breath sounds.   Abdominal:      General: Abdomen is flat. Bowel sounds are normal.      Palpations: Abdomen is soft.   Musculoskeletal:         General: Normal range of motion.      Cervical back: Normal range of motion and neck supple.      Comments: Right ankle: tender to palpation edema ROM intact, positive DP PT pulse   Skin:     General: Skin is warm.      Capillary Refill: Capillary refill takes less than 2 seconds.   Neurological:      General: No focal deficit present.      Mental Status: He is alert and oriented to person, place, and time. Mental status is at baseline.   Psychiatric:         Mood and Affect: Mood normal.         Thought Content: Thought content normal.         Vital Signs  ED Triage  Vitals [03/17/24 2034]   Temperature Pulse Respirations Blood Pressure SpO2   98.4 °F (36.9 °C) 68 16 110/67 97 %      Temp Source Heart Rate Source Patient Position - Orthostatic VS BP Location FiO2 (%)   Tympanic Monitor Sitting Left arm --      Pain Score       4           Vitals:    03/17/24 2034   BP: 110/67   Pulse: 68   Patient Position - Orthostatic VS: Sitting         Visual Acuity      ED Medications  Medications - No data to display    Diagnostic Studies  Results Reviewed       None                   XR ankle 3+ views RIGHT    (Results Pending)              Procedures  Procedures         ED Course                                             Medical Decision Making  Patient evaluated with imaging.  I reviewed the results and discussed them with the patient.  Patient discharged with appropriate instructions medications and follow-up.  Patient verbalized understanding had no further questions at the time of discharge.  Patient had stable vital signs and well-appearing at the time of discharge.    Problems Addressed:  Ankle sprain: acute illness or injury    Amount and/or Complexity of Data Reviewed  External Data Reviewed: notes.  Radiology: ordered and independent interpretation performed. Decision-making details documented in ED Course.     Details: No acute disease             Disposition  Final diagnoses:   Ankle sprain     Time reflects when diagnosis was documented in both MDM as applicable and the Disposition within this note       Time User Action Codes Description Comment    3/17/2024  9:16 PM Beryl Fonseca Add [S93.409A] Ankle sprain           ED Disposition       ED Disposition   Discharge    Condition   Stable    Date/Time   Sun Mar 17, 2024  9:16 PM    Comment   Frandy Castellanos discharge to home/self care.                   Follow-up Information       Follow up With Specialties Details Why Contact Info Additional Information    Samson Sanon MD  Schedule an appointment as soon as possible for a  visit   [address]  [facility] [address]       Jacek Hassan,  Orthopedic Surgery   [address] [facility] [address], [address]  [facility] [address] [phone]       Critical access hospital Emergency Department Emergency Medicine  If symptoms worsen [address] [facility] [address]  [phone] [facility], [address] [place], [place], [address]            Discharge Medication List as of 3/17/2024  9:17 PM        CONTINUE these medications which have NOT CHANGED    Details   albuterol (PROVENTIL HFA,VENTOLIN HFA) 90 mcg/act inhaler Inhale 2 puffs every 6 (six) hours as needed for wheezing, Historical Med      ALPRAZolam (XANAX) 0.5 mg tablet Starting Tue 6/26/2018, Historical Med      cholecalciferol (VITAMIN D3) 1,000 units tablet Take 1,000 Units by mouth daily, Historical Med      cyanocobalamin 100 MCG tablet Take 100 mcg by mouth daily, Historical Med      escitalopram (LEXAPRO) 20 mg tablet Starting Tue 6/26/2018, Historical Med      EVEKEO 10 MG TABS Starting Sat 5/12/2018, Historical Med      !! fluticasone (FLONASE) 50 mcg/act nasal spray 1 spray into each nostril daily, Historical Med      !! fluticasone (FLONASE) 50 mcg/act nasal spray 1 spray into each nostril daily, Starting Sat 7/10/2021, Normal      Lactobacillus (ACIDOPHILUS) 0.5 MG TABS Take by mouth, Historical Med      Multiple Vitamins-Minerals (MULTIVITAMIN WITH MINERALS) tablet Take 1 tablet by mouth daily, Historical Med      ondansetron (Zofran ODT) 4 mg disintegrating tablet Take 1 tablet (4 mg total) by mouth every 8 (eight) hours as needed for nausea or vomiting, Starting Thu 12/30/2021, Normal       !! - Potential duplicate medications found. Please discuss with provider.          No discharge procedures on file.    PDMP Review       None            ED Provider  Electronically Signed by             Beryl Fonseca,   03/18/24 0004